# Patient Record
Sex: MALE | Race: WHITE | NOT HISPANIC OR LATINO | URBAN - METROPOLITAN AREA
[De-identification: names, ages, dates, MRNs, and addresses within clinical notes are randomized per-mention and may not be internally consistent; named-entity substitution may affect disease eponyms.]

---

## 2022-11-25 ENCOUNTER — OFFICE VISIT (OUTPATIENT)
Dept: FAMILY MEDICINE CLINIC | Facility: CLINIC | Age: 32
End: 2022-11-25

## 2022-11-25 VITALS
HEIGHT: 71 IN | DIASTOLIC BLOOD PRESSURE: 82 MMHG | HEART RATE: 78 BPM | OXYGEN SATURATION: 99 % | SYSTOLIC BLOOD PRESSURE: 132 MMHG | WEIGHT: 278 LBS | BODY MASS INDEX: 38.92 KG/M2

## 2022-11-25 DIAGNOSIS — G47.8 NON-RESTORATIVE SLEEP: ICD-10-CM

## 2022-11-25 DIAGNOSIS — H60.333 ACUTE SWIMMER'S EAR OF BOTH SIDES: ICD-10-CM

## 2022-11-25 DIAGNOSIS — G47.19 EXCESSIVE DAYTIME SLEEPINESS: ICD-10-CM

## 2022-11-25 DIAGNOSIS — H93.8X3 EAR SWELLING, BILATERAL: ICD-10-CM

## 2022-11-25 DIAGNOSIS — R63.5 WEIGHT GAIN: ICD-10-CM

## 2022-11-25 DIAGNOSIS — Z98.84 H/O LAPAROSCOPIC ADJUSTABLE GASTRIC BANDING: ICD-10-CM

## 2022-11-25 DIAGNOSIS — Z98.84 HISTORY OF BARIATRIC SURGERY: ICD-10-CM

## 2022-11-25 DIAGNOSIS — E66.09 CLASS 2 OBESITY DUE TO EXCESS CALORIES WITHOUT SERIOUS COMORBIDITY WITH BODY MASS INDEX (BMI) OF 38.0 TO 38.9 IN ADULT: ICD-10-CM

## 2022-11-25 DIAGNOSIS — Z13.1 SCREENING FOR DIABETES MELLITUS: ICD-10-CM

## 2022-11-25 DIAGNOSIS — Z00.00 ENCOUNTER FOR SCREENING AND PREVENTATIVE CARE: Primary | ICD-10-CM

## 2022-11-25 DIAGNOSIS — Z86.69 HISTORY OF OBSTRUCTIVE SLEEP APNEA: ICD-10-CM

## 2022-11-25 DIAGNOSIS — Z11.59 NEED FOR HEPATITIS C SCREENING TEST: ICD-10-CM

## 2022-11-25 DIAGNOSIS — Z13.0 SCREENING FOR DEFICIENCY ANEMIA: ICD-10-CM

## 2022-11-25 RX ORDER — NEOMYCIN SULFATE, POLYMYXIN B SULFATE AND HYDROCORTISONE 10; 3.5; 1 MG/ML; MG/ML; [USP'U]/ML
4 SUSPENSION/ DROPS AURICULAR (OTIC) 4 TIMES DAILY
Qty: 10 ML | Refills: 0 | Status: SHIPPED | OUTPATIENT
Start: 2022-11-25

## 2022-11-25 NOTE — PATIENT INSTRUCTIONS
Place referrals today for home sleep study and for the bariatric center to follow-up for the lap band  Please obtain the labs that I have ordered for you, no food or drink except for water for 8-12 hours before  This is very important for accurate numbers to review your cholesterol and for possible diabetes  If you require a Tdap, please come back to the office and schedule a nurse visit or when you return in 2 weeks to take a look at the ears

## 2022-11-25 NOTE — PROGRESS NOTES
New Patient Outpatient Note    HPI:     Burak Ramos, 28 y o  male  presents today as a new patient  Patient presents today to establish care  He is looking for a primary care provider locally so that he can follow-up for his several medical issues known  He has a history of bariatric surgery and placement of the lap band  This has not been adjusted in over for 5 years  He is looking to follow-up with bariatric surgery to help with weight loss as well  He also has a child coming in March of 2023  He is excited to welcome his child into the world and is interested in making sure that he is healthy  He does admit to having a history of sleep apnea in college, he did not wear his CPAP and therefore his sleep apnea has been untreated for several years  He is looking to follow-up due to excessive sleepiness during the day as well as non restorative sleep, gasping for breath/possible apneas at night  He understands that his weight does play a part  Family Hx  UTD in chart    Past Medical History:   Diagnosis Date   • Clotting disorder ([facility] [address] )         No past surgical history on file  Current Outpatient Medications:   •  neomycin-polymyxin-hydrocortisone (CORTISPORIN) 0 35%-10,000 units/mL-1% otic suspension, Administer 4 drops into both ears 4 (four) times a day, Disp: 10 mL, Rfl: 0     SOCIAL:   Rent/Own: Own  Currently living with: Wife  Stable food: Yes  Safe At Home: Yes  Hobbies: Hockey  Profession/ employment:  for [employer]   Restriction to medical procedures:  None    SEXUAL HISTORY:   Preference: Women  Sexually Active:  yes  Birth Control:   Actively pregnant    Psychological History  Psychiatric history: intermittent anxiety  History of inpatient: None  Current Therapy/ Provider: None  Current Medications: None    Substance History  Smoking: former, intermittent use currently  Alcohol Use: 1-2 drinks per week  Substance use: none      ROS:   Review of Systems Constitutional: Negative for chills, fever and unexpected weight change  HENT: Positive for postnasal drip and rhinorrhea  Negative for congestion, ear pain, sinus pressure, sinus pain and sore throat  Eyes: Negative for pain, discharge, itching and visual disturbance  Respiratory: Negative for cough, chest tightness and shortness of breath  Cardiovascular: Negative for chest pain and palpitations  Gastrointestinal: Negative for abdominal pain, blood in stool, constipation, diarrhea, nausea and vomiting  Genitourinary: Negative for dysuria and frequency  Skin: Negative for rash and wound  Neurological: Negative for dizziness, light-headedness and headaches  Psychiatric/Behavioral: Negative for self-injury and suicidal ideas  OBJECTIVE  Vitals:    11/25/22 0843   BP: 132/82   Pulse: 78   SpO2: 99%        Physical Exam  Constitutional:       General: He is not in acute distress  Appearance: Normal appearance  He is obese  He is not ill-appearing, toxic-appearing or diaphoretic  HENT:      Head: Normocephalic and atraumatic  Right Ear: Tympanic membrane and external ear normal  There is no impacted cerumen  Left Ear: Tympanic membrane and external ear normal  There is no impacted cerumen  Ears:      Comments: Bilateral ear swelling  Erythematous, nonpurulent, possible eczematous, increased protrusions in to the ear canal bilaterally  May be anatomical variant     Nose: Nose normal  No congestion or rhinorrhea  Mouth/Throat:      Mouth: Mucous membranes are moist       Pharynx: Oropharynx is clear  No oropharyngeal exudate or posterior oropharyngeal erythema  Eyes:      General:         Right eye: No discharge  Left eye: No discharge  Conjunctiva/sclera: Conjunctivae normal       Pupils: Pupils are equal, round, and reactive to light  Cardiovascular:      Rate and Rhythm: Normal rate and regular rhythm  Pulses: Normal pulses        Heart sounds: Normal heart sounds  No murmur heard  No friction rub  No gallop  Pulmonary:      Effort: Pulmonary effort is normal  No respiratory distress  Breath sounds: Normal breath sounds  No stridor  No wheezing, rhonchi or rales  Abdominal:      General: Bowel sounds are normal  There is no distension  Palpations: Abdomen is soft  Tenderness: There is no abdominal tenderness  Musculoskeletal:      Cervical back: Neck supple  No tenderness  Lymphadenopathy:      Cervical: No cervical adenopathy  Skin:     General: Skin is dry  Capillary Refill: Capillary refill takes less than 2 seconds  Neurological:      Mental Status: He is alert  Sensory: No sensory deficit ( light touch present in all 4 extremities)  Motor: No weakness (5/5 in all 4 extremities)  Deep Tendon Reflexes: Reflexes normal (2/4, intact, C5, C6, L4, S1)  ASSESSMENT AND PLAN   Tramaine was seen today for establish care  Diagnoses and all orders for this visit:    Encounter for screening and preventative care  Screening for deficiency anemia  Screening for diabetes mellitus  Weight gain  Class 2 obesity due to excess calories without serious comorbidity with body mass index (BMI) of 38 0 to 38 9 in adult  Need for hepatitis C screening test  Patient is looking to obtain baseline labs and testing to review for cholesterol, electrolytes, kidney/liver function, thyroid testing, and blood counts  He is interested in his cholesterol especially since he has been gaining weight  We will also obtain a TSH for further review of his thyroid function  -     Comprehensive metabolic panel; Future  -     CBC and differential; Future  -     Lipid panel; Future  -     TSH, 3rd generation with Free T4 reflex; Future    History of bariatric surgery  H/O laparoscopic adjustable gastric banding  Patient has history of bariatric surgery with Lap Band    The patient has not had follow up for some time and he still has the device in place  He is not benefiting from the device at this time for he continues to gain weight  -     Ambulatory Referral to Bariatric Surgery; Future    History of obstructive sleep apnea  Excessive daytime sleepiness  Non-restorative sleep  Previously diagnosed with sleep apnea and was on CPAP  The patient was non compliant with his CPAP so it was taken back and he has not been treating symptoms for over 5 years  Last use was in college  Will restart process with home sleep study and then move to titration if positive  -     Home Study; Future    Ear swelling, bilateral  Acute swimmer's ear of both sides  Patient has bilateral swelling or edema in ear canals  Will utilize the topical steroid in cortisporin to help with swelling, if not improved consider referral to ENT     -     neomycin-polymyxin-hydrocortisone (CORTISPORIN) 0 35%-10,000 units/mL-1% otic suspension; Administer 4 drops into both ears 4 (four) times a day        Layla Strauss DO  [facility]  11/25/2022 12:24 PM

## 2022-12-03 LAB — HCV AB SER-ACNC: NEGATIVE

## 2022-12-15 ENCOUNTER — TELEPHONE (OUTPATIENT)
Dept: FAMILY MEDICINE CLINIC | Facility: CLINIC | Age: 32
End: 2022-12-15

## 2022-12-15 NOTE — TELEPHONE ENCOUNTER
Called and left a voicemail to inform him I obtained labs    I will send mychart with this call to ensure patient receives information

## 2022-12-21 ENCOUNTER — RA CDI HCC (OUTPATIENT)
Dept: OTHER | Facility: HOSPITAL | Age: 32
End: 2022-12-21

## 2022-12-21 NOTE — PROGRESS NOTES
[facility]  coding opportunities       Chart reviewed, no opportunity found: CHART REVIEWED, NO OPPORTUNITY FOUND        Patients Insurance        Commercial Insurance: [address] [place] [address]

## 2022-12-28 ENCOUNTER — TELEPHONE (OUTPATIENT)
Dept: SLEEP CENTER | Facility: CLINIC | Age: 32
End: 2022-12-28

## 2022-12-29 ENCOUNTER — OFFICE VISIT (OUTPATIENT)
Dept: FAMILY MEDICINE CLINIC | Facility: CLINIC | Age: 32
End: 2022-12-29

## 2022-12-29 VITALS
HEART RATE: 71 BPM | WEIGHT: 271 LBS | DIASTOLIC BLOOD PRESSURE: 76 MMHG | HEIGHT: 71 IN | SYSTOLIC BLOOD PRESSURE: 110 MMHG | BODY MASS INDEX: 37.94 KG/M2 | OXYGEN SATURATION: 97 % | RESPIRATION RATE: 16 BRPM

## 2022-12-29 DIAGNOSIS — H93.8X3 EAR SWELLING, BILATERAL: ICD-10-CM

## 2022-12-29 DIAGNOSIS — H60.333 ACUTE SWIMMER'S EAR OF BOTH SIDES: ICD-10-CM

## 2022-12-29 DIAGNOSIS — E66.09 CLASS 2 OBESITY DUE TO EXCESS CALORIES WITHOUT SERIOUS COMORBIDITY WITH BODY MASS INDEX (BMI) OF 38.0 TO 38.9 IN ADULT: ICD-10-CM

## 2022-12-29 DIAGNOSIS — Z86.69 HISTORY OF OBSTRUCTIVE SLEEP APNEA: ICD-10-CM

## 2022-12-29 DIAGNOSIS — E78.1 HYPERTRIGLYCERIDEMIA: Primary | ICD-10-CM

## 2022-12-29 DIAGNOSIS — G47.8 NON-RESTORATIVE SLEEP: ICD-10-CM

## 2022-12-29 DIAGNOSIS — G47.19 EXCESSIVE DAYTIME SLEEPINESS: ICD-10-CM

## 2022-12-29 DIAGNOSIS — R74.8 LOW SERUM HDL: ICD-10-CM

## 2022-12-29 NOTE — PROGRESS NOTES
Outpatient Note- Follow up     HPI:     Karyn Morrell , 28 y o  male  presents today for referrals and labs  I personally reviewed the labs with the patient, overall the CMP, CBC, and TSH were within normal limits  His lipid panel had normal values for total cholesterol, LDL, and non-HDL  His triglycerides values were over double the recommended level at 330  His HDL was slightly decreased, under 30  He has been attempting to change his diet since we reviewed his labs over the phone  He has lost about 7 pounds and has started using more plant-based oils such as olive oil, avocado oil, and I Believe it is not better  He is also been limiting his carbs and higher caloric foods  Patient does admit to consuming large amounts of dairy especially since work has been stressful with deadlines and a completion of large project  He is looking to decrease his dairy consumption and improve exercise in the new year  The patient is looking to be healthier and has scheduled his home sleep study for February  He has not followed up with bariatric surgery, but plans to in the near year  He denies any fever, chills, nausea, vomiting, diarrhea, constipation, lightheadedness, dizziness, chest pain, shortness of breath  Past Medical History:   Diagnosis Date   • Clotting disorder ([facility] [address] )       ROS:   Review of Systems   See HPI    OBJECTIVE  Vitals:    12/29/22 1609   BP: 110/76   Pulse: 71   Resp: 16   SpO2: 97%        Physical Exam  Constitutional:       General: He is not in acute distress  Appearance: Normal appearance  He is obese  He is not ill-appearing, toxic-appearing or diaphoretic  HENT:      Head: Normocephalic and atraumatic  Right Ear: Tympanic membrane, ear canal and external ear normal  There is no impacted cerumen  Left Ear: Tympanic membrane, ear canal and external ear normal  There is no impacted cerumen  Nose: No congestion or rhinorrhea        Mouth/Throat:      Mouth: Mucous membranes are moist       Pharynx: No oropharyngeal exudate or posterior oropharyngeal erythema  Eyes:      General:         Right eye: No discharge  Left eye: No discharge  Pupils: Pupils are equal, round, and reactive to light  Cardiovascular:      Rate and Rhythm: Normal rate and regular rhythm  Pulses: Normal pulses  Heart sounds: Normal heart sounds  No murmur heard  No friction rub  No gallop  Pulmonary:      Effort: Pulmonary effort is normal  No respiratory distress  Breath sounds: Normal breath sounds  No stridor  No wheezing, rhonchi or rales  Abdominal:      General: Bowel sounds are normal  There is no distension  Palpations: Abdomen is soft  Tenderness: There is no abdominal tenderness  Musculoskeletal:      Cervical back: Neck supple  No tenderness  Lymphadenopathy:      Cervical: No cervical adenopathy  Skin:     General: Skin is warm  Capillary Refill: Capillary refill takes less than 2 seconds  Neurological:      Mental Status: He is alert  ASSESSMENT AND SHERRY   Tramaine was seen today for follow-up  Diagnoses and all orders for this visit:    Hypertriglyceridemia  Low serum HDL  Patient has evidence of low HDL and elevated triglycerides  I recommend that he  decrease higher cholesterol foods, increased plant-based oils, and monitor his caloric intake  He is already done a good job and reducing his weight by 7 pounds, this is primarily by eating healthier and decreasing carbohydrates  We will follow-up with the patient's values in 3 months  If this is still elevated I would recommend that the patient's start on Tricor to reduce the risk of long-term heart disease   -     Lipid panel; Future    Acute swimmer's ear of both sides  Ear swelling, bilateral  Patient still has inflammation of the ear canal bilaterally    He has not attempted the topical drops, and therefore recommend he pick them up and attempt to use them over the next 1 to 2 weeks  We will follow-up with his ears in about 3 months  Class 2 obesity due to excess calories without serious comorbidity with body mass index (BMI) of 38 0 to 38 9 in adult  History of obstructive sleep apnea  Excessive daytime sleepiness  Non-restorative sleep  Patient has been working on losing weight  He has also set up an appointment for his home sleep study in February  He is looking forward to following up and being more compliant on CPAP to improve his sleep  He continues to wake up frequently overnight especially with movement of his wife             Raquel Almodovar,   [facility]  12/29/2022 5:54 PM

## 2022-12-29 NOTE — PATIENT INSTRUCTIONS
Please obtain the lipid panel that I have ordered for you in about 3 months  This should be fasting, no food or drink except for water for 8 to 12 hours before  These make sure you are watching your diet and avoiding any foods that may be high and cholesterol  Try to stick more to plant-based oils such as olive oil, avocados, legumes, nuts  You can start using the drops if you like, there is still some irritation in bilateral sides  When you follow-up with me in about 3 months we will reevaluate this

## 2023-02-09 ENCOUNTER — HOSPITAL ENCOUNTER (OUTPATIENT)
Dept: SLEEP CENTER | Facility: CLINIC | Age: 33
Discharge: HOME | End: 2023-02-09

## 2023-02-09 DIAGNOSIS — G47.8 NON-RESTORATIVE SLEEP: ICD-10-CM

## 2023-02-09 DIAGNOSIS — Z86.69 HISTORY OF OBSTRUCTIVE SLEEP APNEA: ICD-10-CM

## 2023-02-09 DIAGNOSIS — G47.19 EXCESSIVE DAYTIME SLEEPINESS: ICD-10-CM

## 2023-02-10 NOTE — PROGRESS NOTES
Home Sleep Study Documentation    HOME STUDY DEVICE: Noxturnal no                                           Viviana G3 yes      Pre-Sleep Home Study:    Set-up and instructions performed by: Hannah    Technician performed demonstration for Patient: yes    Return demonstration performed by Patient: yes    Written instructions provided to Patient: yes    Patient signed consent form: yes        Post-Sleep Home Study:    Additional comments by Patient: None    Home Sleep Study Failed:no:    Failure reason: N/A    Reported or Detected: N/A    Scored by: Hannah

## 2023-02-15 ENCOUNTER — TELEPHONE (OUTPATIENT)
Dept: FAMILY MEDICINE CLINIC | Facility: CLINIC | Age: 33
End: 2023-02-15

## 2023-02-15 DIAGNOSIS — G47.8 NON-RESTORATIVE SLEEP: ICD-10-CM

## 2023-02-15 DIAGNOSIS — G47.33 MODERATE OBSTRUCTIVE SLEEP APNEA: Primary | ICD-10-CM

## 2023-02-15 DIAGNOSIS — G47.19 EXCESSIVE DAYTIME SLEEPINESS: ICD-10-CM

## 2023-02-15 DIAGNOSIS — Z86.69 HISTORY OF OBSTRUCTIVE SLEEP APNEA: ICD-10-CM

## 2023-02-15 NOTE — TELEPHONE ENCOUNTER
Called patient and informed him that his recent study at the sleep center requires further follow-up  The patient requires a titration  I will place orders and send a [employer] message      Isabel Parker DO  [facility]  2/15/2023 1:54 PM

## 2023-02-20 ENCOUNTER — TELEPHONE (OUTPATIENT)
Dept: SLEEP CENTER | Facility: CLINIC | Age: 33
End: 2023-02-20

## 2023-02-20 NOTE — TELEPHONE ENCOUNTER
----- Message from Jamal Collet, DO sent at 2/19/2023  9:50 PM EST -----  approved  ----- Message -----  From: Balwinder Gorman  Sent: 2/17/2023   8:00 AM EST  To: Sleep Medicine [facility] Provider    This Diagnostic sleep study needs approval      If approved please sign and return to clerical pool  If denied please include reasons why  Also provide alternative testing if warranted  Please sign and return to clerical pool

## 2023-02-20 NOTE — TELEPHONE ENCOUNTER
Home sleep study shows moderate JANET    Patient needs to be scheduled for a consult with Dr Marisol Weston     Left call back message

## 2023-03-14 NOTE — TELEPHONE ENCOUNTER
----- Message from Ashley Sheehan DO sent at 12/28/2022 12:18 PM EST -----  approved  ----- Message -----  From: Negro Braswell  Sent: 12/15/2022   7:29 AM EST  To: Sleep Medicine [facility] Provider    This home sleep study needs approval      If approved please sign and return to clerical pool  If denied please include reasons why  Also provide alternative testing if warranted  Please sign and return to clerical pool 
General Sunscreen Counseling: I recommended over-the-counter SPF 30 or higher sunscreen applied prior to going outdoors, even on cloudy days, and the use of broad-brimmed hats and sun-protective clothing. I advised that sunscreen should be reapplied every 2 hours, and immediately after swimming. I recommended the use of a daily facial moisturizer containing SPF 30 or higher sunscreen.
Products Recommended: Daily facial moisturizer - Cetaphil oil control moisturizer with SPF 30
Detail Level: Zone

## 2023-06-29 ENCOUNTER — HOSPITAL ENCOUNTER (OUTPATIENT)
Dept: SLEEP CENTER | Facility: CLINIC | Age: 33
Discharge: HOME | End: 2023-06-29
Payer: COMMERCIAL

## 2023-06-29 ENCOUNTER — TELEPHONE (OUTPATIENT)
Dept: SLEEP CENTER | Facility: CLINIC | Age: 33
End: 2023-06-29

## 2023-06-29 ENCOUNTER — TRANSCRIBE ORDERS (OUTPATIENT)
Dept: SLEEP CENTER | Facility: CLINIC | Age: 33
End: 2023-06-29

## 2023-06-29 DIAGNOSIS — G47.33 OSA (OBSTRUCTIVE SLEEP APNEA): ICD-10-CM

## 2023-06-29 DIAGNOSIS — G47.33 OSA (OBSTRUCTIVE SLEEP APNEA): Primary | ICD-10-CM

## 2023-06-29 PROCEDURE — 95811 POLYSOM 6/>YRS CPAP 4/> PARM: CPT

## 2023-06-29 NOTE — TELEPHONE ENCOUNTER
Wrong order was placed  Pt had a HST, needs a CPAP study  I transcribed for Dr Blake Baron to Ricardo  Emailed for auth to be updated

## 2023-06-30 NOTE — PROGRESS NOTES
Sleep Study Documentation    Pre-Sleep Study       Sleep testing procedure explained to patient:YES    Patient napped prior to study:NO    Caffeine:Dayshift worker after 12PM   Caffeine use:NO    Alcohol:Dayshift workers after 5PM: Alcohol use:NO    Typical day for patient:YES       Study Documentation    Sleep Study Indications: JANET    Sleep Study: Treatment   Optimal PAP pressure: 8  Leak:None  Snore:Eliminated  REM Obtained:yes  Supplemental O2: no    Minimum SaO2 88  Baseline SaO2 96  PAP mask tried (list all)N30, Gabino  PAP mask choice (final)Gabino  PAP mask type:nasal  PAP pressure at which snoring was eliminated 8  Minimum SaO2 at final PAP pressure 90  Mode of Therapy:CPAP  ETCO2:No  CPAP changed to BiPAP:No    Mode of Therapy:CPAP    EKG abnormalities: no     EEG abnormalities: no    Sleep Study Recorded < 2 hours: N/A    Sleep Study Recorded > 2 hours but incomplete study: N/A    Sleep Study Recorded 6 hours but no sleep obtained: NO    Patient classification: employed       Post-Sleep Study    Medication used at bedtime or during sleep study:NO    Patient reports time it took to fall asleep:less than 20 minutes    Patient reports waking up during study:3 or more times  Patient reports returning to sleep without difficulty  Patient reports sleeping 4 to 6 hours without dreaming  Patient reports sleep during study:better than usual    Patient rated sleepiness: Somewhat sleepy or tired    PAP treatment:yes: Post PAP treatment patient reports feeling better and  would wear PAP mask at home

## 2023-07-04 ENCOUNTER — TELEPHONE (OUTPATIENT)
Dept: FAMILY MEDICINE CLINIC | Facility: CLINIC | Age: 33
End: 2023-07-04

## 2023-07-12 ENCOUNTER — PATIENT MESSAGE (OUTPATIENT)
Dept: FAMILY MEDICINE CLINIC | Facility: CLINIC | Age: 33
End: 2023-07-12

## 2023-07-12 LAB
DME PARACHUTE DELIVERY DATE REQUESTED: NORMAL
DME PARACHUTE ITEM DESCRIPTION: NORMAL
DME PARACHUTE ORDER STATUS: NORMAL
DME PARACHUTE SUPPLIER NAME: NORMAL
DME PARACHUTE SUPPLIER PHONE: NORMAL

## 2023-07-12 NOTE — PROGRESS NOTES
Order placed after reviewing the patient's recent sleep study. He had sleep disorder insomnia and required a PAP of 8 to have improved sleep. According to the read he has sleep disorder breathing.       Elisabet Simon DO  [facility]  7/12/2023 2:26 PM

## 2023-07-12 NOTE — TELEPHONE ENCOUNTER
From: Jeffery Ochoa  To: Jillian Tee  Sent: 7/4/2023 10:52 AM EDT  Subject: Follow up sleep study    Dear . Bora Beverly,     Your CPAP study came back and recommended a specific pressure to help with your sleep. I just confirming you need a CPAP? I have written down in my notes you may have one already. I do not see an order for me. If you need a CPAP please let me know and I will place the order to start the process to get one. Thank you in advance for your time and response.      Sincerely,     Dr. Rebecca Simmons DO

## 2023-09-07 LAB

## 2023-09-16 LAB

## 2023-10-18 ENCOUNTER — PATIENT MESSAGE (OUTPATIENT)
Dept: FAMILY MEDICINE CLINIC | Facility: CLINIC | Age: 33
End: 2023-10-18

## 2023-10-20 LAB
DME PARACHUTE DELIVERY DATE ACTUAL: NORMAL
DME PARACHUTE DELIVERY DATE REQUESTED: NORMAL
DME PARACHUTE ITEM DESCRIPTION: NORMAL
DME PARACHUTE ORDER STATUS: NORMAL
DME PARACHUTE SUPPLIER NAME: NORMAL
DME PARACHUTE SUPPLIER PHONE: NORMAL

## 2024-02-26 LAB

## 2024-06-09 ENCOUNTER — APPOINTMENT (EMERGENCY)
Dept: RADIOLOGY | Facility: HOSPITAL | Age: 34
End: 2024-06-09
Payer: COMMERCIAL

## 2024-06-09 ENCOUNTER — APPOINTMENT (EMERGENCY)
Dept: CT IMAGING | Facility: HOSPITAL | Age: 34
End: 2024-06-09
Payer: COMMERCIAL

## 2024-06-09 ENCOUNTER — HOSPITAL ENCOUNTER (EMERGENCY)
Facility: HOSPITAL | Age: 34
Discharge: HOME | End: 2024-06-09
Attending: EMERGENCY MEDICINE
Payer: COMMERCIAL

## 2024-06-09 VITALS
RESPIRATION RATE: 18 BRPM | HEIGHT: 71 IN | WEIGHT: 250.66 LBS | OXYGEN SATURATION: 96 % | DIASTOLIC BLOOD PRESSURE: 79 MMHG | HEART RATE: 65 BPM | TEMPERATURE: 98.2 F | SYSTOLIC BLOOD PRESSURE: 169 MMHG | BODY MASS INDEX: 35.09 KG/M2

## 2024-06-09 DIAGNOSIS — K95.09 GASTRIC BAND MALFUNCTION: Primary | ICD-10-CM

## 2024-06-09 DIAGNOSIS — R10.9 ABDOMINAL PAIN: ICD-10-CM

## 2024-06-09 DIAGNOSIS — R11.10 VOMITING: ICD-10-CM

## 2024-06-09 LAB
ALBUMIN SERPL BCP-MCNC: 4.2 G/DL (ref 3.5–5)
ALP SERPL-CCNC: 60 U/L (ref 34–104)
ALT SERPL W P-5'-P-CCNC: 30 U/L (ref 7–52)
ANION GAP SERPL CALCULATED.3IONS-SCNC: 6 MMOL/L (ref 4–13)
AST SERPL W P-5'-P-CCNC: 24 U/L (ref 13–39)
BASOPHILS # BLD AUTO: 0.03 THOUSANDS/ÂΜL (ref 0–0.1)
BASOPHILS NFR BLD AUTO: 1 % (ref 0–1)
BILIRUB SERPL-MCNC: 0.79 MG/DL (ref 0.2–1)
BUN SERPL-MCNC: 15 MG/DL (ref 5–25)
CALCIUM SERPL-MCNC: 9.3 MG/DL (ref 8.4–10.2)
CHLORIDE SERPL-SCNC: 104 MMOL/L (ref 96–108)
CO2 SERPL-SCNC: 27 MMOL/L (ref 21–32)
CREAT SERPL-MCNC: 1.02 MG/DL (ref 0.6–1.3)
EOSINOPHIL # BLD AUTO: 0.06 THOUSAND/ÂΜL (ref 0–0.61)
EOSINOPHIL NFR BLD AUTO: 1 % (ref 0–6)
ERYTHROCYTE [DISTWIDTH] IN BLOOD BY AUTOMATED COUNT: 13.3 % (ref 11.6–15.1)
GFR SERPL CREATININE-BSD FRML MDRD: 95 ML/MIN/1.73SQ M
GLUCOSE SERPL-MCNC: 88 MG/DL (ref 65–140)
HCT VFR BLD AUTO: 46.7 % (ref 36.5–49.3)
HGB BLD-MCNC: 15.5 G/DL (ref 12–17)
IMM GRANULOCYTES # BLD AUTO: 0.01 THOUSAND/UL (ref 0–0.2)
IMM GRANULOCYTES NFR BLD AUTO: 0 % (ref 0–2)
LACTATE SERPL-SCNC: 0.6 MMOL/L (ref 0.5–2)
LIPASE SERPL-CCNC: 19 U/L (ref 11–82)
LYMPHOCYTES # BLD AUTO: 1.36 THOUSANDS/ÂΜL (ref 0.6–4.47)
LYMPHOCYTES NFR BLD AUTO: 22 % (ref 14–44)
MCH RBC QN AUTO: 29.6 PG (ref 26.8–34.3)
MCHC RBC AUTO-ENTMCNC: 33.2 G/DL (ref 31.4–37.4)
MCV RBC AUTO: 89 FL (ref 82–98)
MONOCYTES # BLD AUTO: 0.84 THOUSAND/ÂΜL (ref 0.17–1.22)
MONOCYTES NFR BLD AUTO: 14 % (ref 4–12)
NEUTROPHILS # BLD AUTO: 3.82 THOUSANDS/ÂΜL (ref 1.85–7.62)
NEUTS SEG NFR BLD AUTO: 62 % (ref 43–75)
NRBC BLD AUTO-RTO: 0 /100 WBCS
PLATELET # BLD AUTO: 263 THOUSANDS/UL (ref 149–390)
PMV BLD AUTO: 9.1 FL (ref 8.9–12.7)
POTASSIUM SERPL-SCNC: 4.1 MMOL/L (ref 3.5–5.3)
PROT SERPL-MCNC: 7 G/DL (ref 6.4–8.4)
RBC # BLD AUTO: 5.24 MILLION/UL (ref 3.88–5.62)
SODIUM SERPL-SCNC: 137 MMOL/L (ref 135–147)
WBC # BLD AUTO: 6.12 THOUSAND/UL (ref 4.31–10.16)

## 2024-06-09 PROCEDURE — 74177 CT ABD & PELVIS W/CONTRAST: CPT

## 2024-06-09 PROCEDURE — 80053 COMPREHEN METABOLIC PANEL: CPT

## 2024-06-09 PROCEDURE — 96365 THER/PROPH/DIAG IV INF INIT: CPT

## 2024-06-09 PROCEDURE — 96366 THER/PROPH/DIAG IV INF ADDON: CPT

## 2024-06-09 PROCEDURE — 85025 COMPLETE CBC W/AUTO DIFF WBC: CPT

## 2024-06-09 PROCEDURE — 36415 COLL VENOUS BLD VENIPUNCTURE: CPT

## 2024-06-09 PROCEDURE — 83605 ASSAY OF LACTIC ACID: CPT

## 2024-06-09 PROCEDURE — 74018 RADEX ABDOMEN 1 VIEW: CPT

## 2024-06-09 PROCEDURE — 99285 EMERGENCY DEPT VISIT HI MDM: CPT | Performed by: EMERGENCY MEDICINE

## 2024-06-09 PROCEDURE — 99284 EMERGENCY DEPT VISIT MOD MDM: CPT

## 2024-06-09 PROCEDURE — 83690 ASSAY OF LIPASE: CPT

## 2024-06-09 RX ORDER — SODIUM CHLORIDE, SODIUM GLUCONATE, SODIUM ACETATE, POTASSIUM CHLORIDE, MAGNESIUM CHLORIDE, SODIUM PHOSPHATE, DIBASIC, AND POTASSIUM PHOSPHATE .53; .5; .37; .037; .03; .012; .00082 G/100ML; G/100ML; G/100ML; G/100ML; G/100ML; G/100ML; G/100ML
1000 INJECTION, SOLUTION INTRAVENOUS ONCE
Status: COMPLETED | OUTPATIENT
Start: 2024-06-09 | End: 2024-06-09

## 2024-06-09 RX ADMIN — IOHEXOL 50 ML: 240 INJECTION, SOLUTION INTRATHECAL; INTRAVASCULAR; INTRAVENOUS; ORAL at 14:02

## 2024-06-09 RX ADMIN — IOHEXOL 50 ML: 300 INJECTION, SOLUTION INTRAVENOUS at 17:38

## 2024-06-09 RX ADMIN — SODIUM CHLORIDE, SODIUM GLUCONATE, SODIUM ACETATE, POTASSIUM CHLORIDE, MAGNESIUM CHLORIDE, SODIUM PHOSPHATE, DIBASIC, AND POTASSIUM PHOSPHATE 1000 ML: .53; .5; .37; .037; .03; .012; .00082 INJECTION, SOLUTION INTRAVENOUS at 13:05

## 2024-06-09 RX ADMIN — IOHEXOL 85 ML: 350 INJECTION, SOLUTION INTRAVENOUS at 14:02

## 2024-06-09 NOTE — CONSULTS
Consultation - Bariatric Surgery   Tramaine Mijares 34 y.o. male MRN: [id number]  Unit/Bed#: ED-19 Encounter: [id number]    Assessment & Plan     Assessment:  35 y/o M with a history of gastric band placement in 2010 who presents to the ED for 24-48h of inability to keep liquids down. CT scan shows evidence of contrast stasis at the level of the gastric band suggestive of band obstruction.    Plan:  -Aspirated 10cc of fluid out of gastric band emptying it completely  -Will plan to given patient oral contrast and get KUB to evaluate flow of contrast  -If continues to be unable to tolerate PO, may require removal of gastric band    History of Present Illness     HPI:  Tramaine Mijares is a 34 y.o. male Body mass index is 34.96 kg/m². with history of gastric band placement in 2010 who presnts to the ED with inability to tolerate PO intake for the past 24-48h. He has noticed that this started since his son was jumping up on his abdomen on Friday which he suspects may have dislodged the band. Since that event he has been unable to keep any food or liquid down.  This AM he presented to the ED and CT showed evidence of contrast stasis at the level of the gastric band. Patient otherwise stable and labs within normal limits.      Review of Systems   All other systems reviewed and are negative.      Historical Information   Past Medical History:   Diagnosis Date    Clotting disorder (HCC)      Past Surgical History:   Procedure Laterality Date    HERNIA REPAIR      LAPAROSCOPIC GASTRIC BANDING      approx 09-10    WRIST SURGERY       Social History   Social History     Substance and Sexual Activity   Alcohol Use Yes    Alcohol/week: 1.0 - 2.0 standard drink of alcohol    Types: 1 - 2 Standard drinks or equivalent per week     Social History     Substance and Sexual Activity   Drug Use Never     Social History     Tobacco Use   Smoking Status Former    Current packs/day: 0.25    Average packs/day: 0.3 packs/day for 3.0  years (0.8 ttl pk-yrs)    Types: Cigarettes   Smokeless Tobacco Never     Family History: non-contributory    Meds/Allergies   all current active meds have been reviewed  No Known Allergies    Objective   First Vitals:   Blood Pressure: 169/79 (06/09/24 1132)  Pulse: 65 (06/09/24 1132)  Temperature: 98.2 °F (36.8 °C) (06/09/24 1132)  Temp Source: Oral (06/09/24 1132)  Respirations: 18 (06/09/24 1132)  Weight - Scale: 114 kg (250 lb 10.6 oz) (06/09/24 1132)  SpO2: 96 % (06/09/24 1132)    Current Vitals:   Blood Pressure: 169/79 (06/09/24 1132)  Pulse: 65 (06/09/24 1132)  Temperature: 98.2 °F (36.8 °C) (06/09/24 1132)  Temp Source: Oral (06/09/24 1132)  Respirations: 18 (06/09/24 1132)  Weight - Scale: 114 kg (250 lb 10.6 oz) (06/09/24 1132)  SpO2: 96 % (06/09/24 1132)    No intake or output data in the 24 hours ending 06/09/24 1638    Invasive Devices       Peripheral Intravenous Line  Duration             Peripheral IV 06/09/24 Right Antecubital <1 day                    Physical Exam  Constitutional:       Appearance: He is well-developed.   HENT:      Head: Normocephalic and atraumatic.   Cardiovascular:      Rate and Rhythm: Normal rate.      Heart sounds: Normal heart sounds.   Pulmonary:      Effort: Pulmonary effort is normal.   Abdominal:      Palpations: Abdomen is soft.      Comments: Palpable gastric band port in RUQ. Removed 10cc of fluid from port (now emptied completely)   Neurological:      General: No focal deficit present.      Mental Status: He is alert.         Lab Results: CBC:   Lab Results   Component Value Date    WBC 6.12 06/09/2024    HGB 15.5 06/09/2024    HCT 46.7 06/09/2024    MCV 89 06/09/2024     06/09/2024    RBC 5.24 06/09/2024    MCH 29.6 06/09/2024    MCHC 33.2 06/09/2024    RDW 13.3 06/09/2024    MPV 9.1 06/09/2024    NRBC 0 06/09/2024     Imaging: I have personally reviewed pertinent reports.    EKG, Pathology, and Other Studies: I have personally reviewed pertinent reports.

## 2024-06-09 NOTE — ED ATTENDING ATTESTATION
6/9/2024  I, Shad Eubanks MD, saw and evaluated the patient. I have discussed the patient with the resident/non-physician practitioner and agree with the resident's/non-physician practitioner's findings, Plan of Care, and MDM as documented in the resident's/non-physician practitioner's note, except where noted. All available labs and Radiology studies were reviewed.  I was present for key portions of any procedure(s) performed by the resident/non-physician practitioner and I was immediately available to provide assistance.    At this point I agree with the current assessment done in the Emergency Department. I have conducted an independent evaluation of this patient a history and physical is as follows:    This is a 34 y.o. old male who presents to the ED for evaluation of abdominal pain.  History of lap band in 2010, for the last few days states when he eats or drinks he feels like there is an obstruction that he cannot get food passed.  He has pressure in his chest when he attempts to ingest anything no abdominal swelling.  No changes in bowel habits.  Surgery was at [facility] in 2010 however he has been lost to follow-up there.  He is open to transfer care here to [facility] and is requested a referral from his family doctor which is currently pending.    VS and nursing notes reviewed  General: Appears in NAD, awake, alert, speaking normally in full sentences.   Well-nourished, well-developed. Appears stated age.   Head: Normocephalic, atraumatic.  Eyes: EOMI. Vision grossly normal. No subconjunctival hemorrhages or occular discharge noted. Symmetrical lids.   ENT: Atraumatic external nose and ears. No stridor. Normal phonation. No drooling. Normal swallowing.   Neck: No JVD. FROM. No goiter  CV: No pallor. Normal rate.  Lungs: No tachypnea. No respiratory distress.  ABD: No epigastric tenderness.  MSK: Moving all extremities equally, no peripheral edema  Skin: Dry, intact. No  cyanosis  Neuro: Awake, alert, GCS15. CN II-XII grossly intact. Grossly normal gait.  Psychiatric/Behavioral: Appropriate mood and affect.     A/P: This is a 34 y.o. male who presents to the ED for evaluation of difficulty swallowing.  Exam is reassuring.  At this time, concern for Lap-Band complication.  CAT scan p.o. IV contrast.  Reevaluate disposition accordingly.      ED Course         Critical Care Time  Procedures

## 2024-06-09 NOTE — DISCHARGE INSTRUCTIONS
Please follow-up with bariatric surgeon, Dr. Glass, number listed below/on next page.    Please return the emergency department if you have recurrence of the symptoms.  Vomiting blood, worsening pain, or any new or worsening symptoms.

## 2024-06-09 NOTE — ED PROVIDER NOTES
History  Chief Complaint   Patient presents with    Abdominal Pain     Patient had gastric surgery in 2010 and had a lap band placed. Patient is the last 3 days hasn't been able to keep anything food/water down, patient is worried that the band might have moved on him   Vomiting with eating only, patient is passing gas and having BMs     HPI  (Tramaine Mijares) Tramaine Mijares is a 34 y.o. male     They presented to the emergency department on June 12, 2024.   Chief Complaint   Patient presents with    Abdominal Pain     Patient had gastric surgery in 2010 and had a lap band placed. Patient is the last 3 days hasn't been able to keep anything food/water down, patient is worried that the band might have moved on him   Vomiting with eating only, patient is passing gas and having BMs   .    The patient states that he does not feel nauseous at all but every time he takes anything by mouth whether it be liquid or solid he vomits it back up.  Patient states he feels a getting stuck at the lower chest/upper abdomen region.  Patient states that his son who is approximately 6 months of age was jumping on his stomach 3 nights ago denies any other injuries. Patient denies fever, chills, nausea, diarrhea, constipation, chest pain, shortness of breath, hematic emesis, or any other complaint at this time.  Patient is able to tolerate his secretions    Prior to Admission Medications   Prescriptions Last Dose Informant Patient Reported? Taking?   neomycin-polymyxin-hydrocortisone (CORTISPORIN) 0.35%-10,000 units/mL-1% otic suspension   No No   Sig: Administer 4 drops into both ears 4 (four) times a day   Patient not taking: Reported on 12/29/2022      Facility-Administered Medications: None       Past Medical History:   Diagnosis Date    Clotting disorder (HCC)        Past Surgical History:   Procedure Laterality Date    HERNIA REPAIR      LAPAROSCOPIC GASTRIC BANDING      approx 09-10    WRIST SURGERY         Family History    Problem Relation Age of Onset    TAYLER disease Mother     Hypertension Father     Hyperlipidemia Father     Lung cancer Maternal Uncle     Hepatitis Maternal Uncle     Colon cancer Neg Hx     Prostate cancer Neg Hx     Testicular cancer Neg Hx      I have reviewed and agree with the history as documented.    E-Cigarette/Vaping    E-Cigarette Use Former User      E-Cigarette/Vaping Substances     Social History     Tobacco Use    Smoking status: Former     Current packs/day: 0.25     Average packs/day: 0.3 packs/day for 3.0 years (0.8 ttl pk-yrs)     Types: Cigarettes    Smokeless tobacco: Never   Vaping Use    Vaping status: Former   Substance Use Topics    Alcohol use: Yes     Alcohol/week: 1.0 - 2.0 standard drink of alcohol     Types: 1 - 2 Standard drinks or equivalent per week    Drug use: Never        Review of Systems   Constitutional:  Negative for chills and fever.   HENT:  Negative for ear pain and sore throat.    Eyes:  Negative for pain and visual disturbance.   Respiratory:  Negative for cough and shortness of breath.    Cardiovascular:  Negative for chest pain and palpitations.   Gastrointestinal:  Positive for abdominal pain and vomiting. Negative for blood in stool, constipation, diarrhea and nausea.        Denies hematemesis   Genitourinary:  Negative for dysuria and hematuria.   Musculoskeletal:  Negative for arthralgias and back pain.   Skin:  Negative for color change and rash.   Neurological:  Negative for seizures and syncope.   All other systems reviewed and are negative.      Physical Exam  ED Triage Vitals [06/09/24 1132]   Temperature Pulse Respirations Blood Pressure SpO2   98.2 °F (36.8 °C) 65 18 169/79 96 %      Temp Source Heart Rate Source Patient Position - Orthostatic VS BP Location FiO2 (%)   Oral Monitor -- Right arm --      Pain Score       --             Orthostatic Vital Signs  Vitals:    06/09/24 1132   BP: 169/79   Pulse: 65       Physical Exam  Vitals and nursing note  reviewed.   Constitutional:       General: He is not in acute distress.     Appearance: He is well-developed.   HENT:      Head: Normocephalic and atraumatic.   Eyes:      Conjunctiva/sclera: Conjunctivae normal.   Cardiovascular:      Rate and Rhythm: Normal rate and regular rhythm.      Heart sounds: No murmur heard.  Pulmonary:      Effort: Pulmonary effort is normal. No respiratory distress.      Breath sounds: Normal breath sounds.   Abdominal:      Palpations: Abdomen is soft.      Tenderness: There is no abdominal tenderness. There is no right CVA tenderness, left CVA tenderness, guarding or rebound.   Musculoskeletal:         General: No swelling.      Cervical back: Neck supple.   Skin:     General: Skin is warm and dry.      Capillary Refill: Capillary refill takes less than 2 seconds.   Neurological:      Mental Status: He is alert.   Psychiatric:         Mood and Affect: Mood normal.         ED Medications  Medications   multi-electrolyte (PLASMALYTE-A/ISOLYTE-S PH 7.4) IV solution 1,000 mL (0 mL Intravenous Stopped 6/9/24 1500)   iohexol (OMNIPAQUE) 240 MG/ML solution 50 mL (50 mL Oral Given 6/9/24 1402)   iohexol (OMNIPAQUE) 350 MG/ML injection (MULTI-DOSE) 85 mL (85 mL Intravenous Given 6/9/24 1402)   iohexol (OMNIPAQUE) 300 mg/mL injection 50 mL (50 mL Oral Given 6/9/24 1738)       Diagnostic Studies  Results Reviewed       Procedure Component Value Units Date/Time    Lipase [[id number]]  (Normal) Collected: 06/09/24 1305    Lab Status: Final result Specimen: Blood from Arm, Right Updated: 06/09/24 1427     Lipase 19 u/L     Lactic acid, plasma (w/reflex if result > 2.0) [[id number]]  (Normal) Collected: 06/09/24 1305    Lab Status: Final result Specimen: Blood from Arm, Right Updated: 06/09/24 1340     LACTIC ACID 0.6 mmol/L     Narrative:      Result may be elevated if tourniquet was used during collection.    Comprehensive metabolic panel [[id number]] Collected: 06/09/24 1305    Lab Status: Final  result Specimen: Blood from Arm, Right Updated: 06/09/24 1339     Sodium 137 mmol/L      Potassium 4.1 mmol/L      Chloride 104 mmol/L      CO2 27 mmol/L      ANION GAP 6 mmol/L      BUN 15 mg/dL      Creatinine 1.02 mg/dL      Glucose 88 mg/dL      Calcium 9.3 mg/dL      AST 24 U/L      ALT 30 U/L      Alkaline Phosphatase 60 U/L      Total Protein 7.0 g/dL      Albumin 4.2 g/dL      Total Bilirubin 0.79 mg/dL      eGFR 95 ml/min/1.73sq m     Narrative:      National Kidney Disease Foundation guidelines for Chronic Kidney Disease (CKD):     Stage 1 with normal or high GFR (GFR > 90 mL/min/1.73 square meters)    Stage 2 Mild CKD (GFR = 60-89 mL/min/1.73 square meters)    Stage 3A Moderate CKD (GFR = 45-59 mL/min/1.73 square meters)    Stage 3B Moderate CKD (GFR = 30-44 mL/min/1.73 square meters)    Stage 4 Severe CKD (GFR = 15-29 mL/min/1.73 square meters)    Stage 5 End Stage CKD (GFR <15 mL/min/1.73 square meters)  Note: GFR calculation is accurate only with a steady state creatinine    CBC and differential [[id number]]  (Abnormal) Collected: 06/09/24 1305    Lab Status: Final result Specimen: Blood from Arm, Right Updated: 06/09/24 1317     WBC 6.12 Thousand/uL      RBC 5.24 Million/uL      Hemoglobin 15.5 g/dL      Hematocrit 46.7 %      MCV 89 fL      MCH 29.6 pg      MCHC 33.2 g/dL      RDW 13.3 %      MPV 9.1 fL      Platelets 263 Thousands/uL      nRBC 0 /100 WBCs      Segmented % 62 %      Immature Grans % 0 %      Lymphocytes % 22 %      Monocytes % 14 %      Eosinophils Relative 1 %      Basophils Relative 1 %      Absolute Neutrophils 3.82 Thousands/µL      Absolute Immature Grans 0.01 Thousand/uL      Absolute Lymphocytes 1.36 Thousands/µL      Absolute Monocytes 0.84 Thousand/µL      Eosinophils Absolute 0.06 Thousand/µL      Basophils Absolute 0.03 Thousands/µL                    XR abdomen 1 view portable   ED Interpretation by Sarika Molina DO (06/09 7234)   Contrast appears to be passing  through the upper stomach and into small intestine.  Improved from CT scan.      Final Result by Armand Rivera MD (06/10 0945)      Gastric lap band is present.      Oral contrast has progressed into the stomach and proximal duodenum.                  Workstation performed: [id number]         CT abdomen pelvis with contrast   Final Result by Henry Vogel MD (06/09 1442)      Slightly abnormal positioning of a gastric band with significant stomal stenosis. The esophagus is dilated and there is no passage of oral contrast into the distal stomach.      The study was marked in EPIC for immediate notification.         Workstation performed: [id number]               Procedures  Procedures      ED Course  ED Course as of 06/12/24 0503   Sun Jun 09, 2024   1319 CBC and differential(!)  Leukocytosis leukopenia.  Hemoglobin hematocrit within normal limits.  Platelets within normal limits.  Reassuring differential.   1359 Comprehensive metabolic panel  Within normal limits.   1359 LACTIC ACID: 0.6   1428 LIPASE: 19  Unlikely pancreatitis.   1445 CT abdomen pelvis with contrast  IMPRESSION:     Slightly abnormal positioning of a gastric band with significant stomal stenosis. The esophagus is dilated and there is no passage of oral contrast into the distal stomach.     The study was marked in EPIC for immediate notification.     1632 Bariatrics to see in ED   1706 Bariatric fellow evaluated patient in the emergency department, removed saline from gastric band.  Recommended abdominal x-ray with contrast to see if contrast will pass into the stomach.   1734 XR abdomen 1 view portable  Contrast appears to be passing through the upper stomach and into small intestine.  Improved from CT scan.   1743 Spoke with bariatric surgery, recommended follow-up in outpatient office with Dr. Glass                                       Medical Decision Making  35 yo M presented to ED for vomiting. Associated symptoms: upper abd  discomfort. Exam findings: Benign exam.  Differentials diagnoses considered: Gastric sleeve malposition versus food bolus versus gastric sleeve causing obstruction to his pancreatitis versus gastritis.  See ED course for more details on lab and imaging interpretation as well as discussion with consults.  Based on these results and H&P, gastric sleeve malposition as well as causing obstruction. Results and clinical impressions were discussed with patient and family. They expressed understanding. Plan: Discharged home with instruction to follow-up with primary care doctor, instruction to follow-up with bariatric surgery instructed to return emerged part for any new or worsening symptoms. This plan was also discussed with patient, who was agreeable with this plan. Patient was given the opportunity to ask questions in ED. All questions and concerns were addressed in ED.     Amount and/or Complexity of Data Reviewed  Labs: ordered. Decision-making details documented in ED Course.  Radiology: ordered and independent interpretation performed. Decision-making details documented in ED Course.    Risk  Prescription drug management.          Disposition  Final diagnoses:   Gastric band malfunction   Abdominal pain   Vomiting     Time reflects when diagnosis was documented in both MDM as applicable and the Disposition within this note       Time User Action Codes Description Comment    6/9/2024  5:52 PM Sarika Molina Add [K95.09] Gastric band malfunction     6/9/2024  5:53 PM Sarika Molina Add [R10.9] Abdominal pain     6/9/2024  5:53 PM Sarika Molina Add [R11.10] Vomiting           ED Disposition       ED Disposition   Discharge    Condition   Stable    Date/Time   Sun Jun 9, 2024  5:53 PM    Comment   Tramaine Mijares discharge to home/self care.                   Follow-up Information       Follow up With Specialties Details Why Contact Info Additional Information    Enrico Schmitt, DO Family Medicine Call in  1 day As needed [address] [facility]  [address]  [facility] [address]  [phone]       [facility] Emergency Department Emergency Medicine Go to  As needed, If symptoms worsen [address] [facility] [address]  [phone] [facility], [address] [place], [place], [address]    Calvin Glass MD Bariatrics, General Surgery Call in 1 day  [address] [address]  [phone]       Calvin Glass MD Bariatrics, General Surgery Call in 1 day  [address] [address]  [phone]               Discharge Medication List as of 6/9/2024  5:53 PM        CONTINUE these medications which have NOT CHANGED    Details   neomycin-polymyxin-hydrocortisone (CORTISPORIN) 0.35%-10,000 units/mL-1% otic suspension Administer 4 drops into both ears 4 (four) times a day, Starting Fri 11/25/2022, Normal           No discharge procedures on file.    PDMP Review       None             ED Provider  Attending physically available and evaluated Tramaine Mijares. I managed the patient along with the ED Attending.    Electronically Signed by           Sarika Molina DO  06/12/24 3684

## 2024-06-12 ENCOUNTER — OFFICE VISIT (OUTPATIENT)
Dept: FAMILY MEDICINE CLINIC | Facility: CLINIC | Age: 34
End: 2024-06-12
Payer: COMMERCIAL

## 2024-06-12 VITALS
OXYGEN SATURATION: 98 % | HEART RATE: 78 BPM | SYSTOLIC BLOOD PRESSURE: 122 MMHG | WEIGHT: 265 LBS | BODY MASS INDEX: 37.1 KG/M2 | DIASTOLIC BLOOD PRESSURE: 80 MMHG | HEIGHT: 71 IN

## 2024-06-12 DIAGNOSIS — Z98.84 H/O LAPAROSCOPIC ADJUSTABLE GASTRIC BANDING: ICD-10-CM

## 2024-06-12 DIAGNOSIS — G47.33 MODERATE OBSTRUCTIVE SLEEP APNEA: ICD-10-CM

## 2024-06-12 DIAGNOSIS — Z82.49 FAMILY HISTORY OF CARDIAC ARREST: Primary | ICD-10-CM

## 2024-06-12 DIAGNOSIS — G47.33 OSA (OBSTRUCTIVE SLEEP APNEA): ICD-10-CM

## 2024-06-12 DIAGNOSIS — Z82.49 FAMILY HISTORY OF CHF (CONGESTIVE HEART FAILURE): ICD-10-CM

## 2024-06-12 DIAGNOSIS — Z82.49 FAMILY HISTORY OF HEART DISEASE: ICD-10-CM

## 2024-06-12 DIAGNOSIS — Z98.84 HISTORY OF BARIATRIC SURGERY: ICD-10-CM

## 2024-06-12 DIAGNOSIS — G47.8 NON-RESTORATIVE SLEEP: ICD-10-CM

## 2024-06-12 PROCEDURE — 99213 OFFICE O/P EST LOW 20 MIN: CPT | Performed by: FAMILY MEDICINE

## 2024-06-12 NOTE — PROGRESS NOTES
Outpatient Note- Follow up     HPI:     Tramaine Mijares , 34 y.o. male  presents today for for multiple concerns.  Over the last several months the patient has been having difficulty with his CPAP and unable to tolerate the multiple masks secondary to the increased dryness in the mouth.  He woke up more times with dry mouth and his tongue sticking to the side or roof of his mouth limiting his sleep than the actual sleep apnea.  Due to his failure of the CPAP, he has been looking into inspire.  He did his research online with the specialty physicians that do this, he was interested in following up with Nico Melissa MD.  He has been having difficulty with obtaining a office visit just due to scheduling restraints.    Additionally the patient brings to my attention he was in the emergency room within the past week.  It seems that the gastric band that he had slipped up and required further intervention.  They removed the saline from the band and he will need to follow-up with the bariatric surgeons for next steps.    Lastly unfortunately his father  suddenly while loading a truck full of lumber.  He went into likely cardiac arrest and unfortunately he was unable to be revived.  The patient also has a strong family history in his paternal grandfather who  suddenly from cardiac related issues.  He is interested in following up with a cardiac physician locally within [facility].    Past Medical History:   Diagnosis Date    Clotting disorder (HCC)       ROS:   Review of Systems   Denies any fever, chills, nausea, vomiting, lightheadedness, dizziness, chest pain, shortness of breath.    OBJECTIVE  Vitals:    24 1642   BP: 122/80   Pulse: 78   SpO2: 98%        Physical Exam  Constitutional:       General: He is not in acute distress.     Appearance: Normal appearance. He is obese. He is not ill-appearing, toxic-appearing or diaphoretic.   HENT:      Head: Normocephalic and atraumatic.      Right Ear:  Tympanic membrane, ear canal and external ear normal. There is no impacted cerumen.      Left Ear: Tympanic membrane, ear canal and external ear normal. There is no impacted cerumen.      Nose: Nose normal. No congestion or rhinorrhea.      Mouth/Throat:      Mouth: Mucous membranes are moist.      Pharynx: Oropharynx is clear. No oropharyngeal exudate or posterior oropharyngeal erythema.   Eyes:      General:         Right eye: No discharge.         Left eye: No discharge.      Pupils: Pupils are equal, round, and reactive to light.   Cardiovascular:      Rate and Rhythm: Normal rate and regular rhythm.      Heart sounds: Normal heart sounds. No murmur heard.     No friction rub. No gallop.   Pulmonary:      Effort: Pulmonary effort is normal. No respiratory distress.      Breath sounds: Normal breath sounds. No stridor. No wheezing, rhonchi or rales.   Abdominal:      General: Bowel sounds are normal. There is no distension.      Palpations: Abdomen is soft.      Tenderness: There is no abdominal tenderness.   Musculoskeletal:      Cervical back: Neck supple. No tenderness.   Lymphadenopathy:      Cervical: No cervical adenopathy.   Skin:     General: Skin is warm.   Neurological:      Mental Status: He is alert.        ASSESSMENT AND PLAN   Tramaine was seen today for follow-up.    Diagnoses and all orders for this visit:    Family history of cardiac arrest  Family history of heart disease  Family history of CHF (congestive heart failure)  Patient presents today with concern about family history of heart disease.  He is interested in following up with cardiology to ensure that he continues to remain as healthy as he can while attempting to lose weight and improve his diet.  He is to call back with any issues with scheduling.  Referral placed.    Moderate obstructive sleep apnea  Non-restorative sleep  JANET (obstructive sleep apnea)  Patient has a history of moderate obstructive sleep apnea.  He was unable to  tolerate the CPAP, therefore is looking into inspire.  We reviewed the qualifications for [facility] and it includes the patient greater than 22 years of age, less than or equal to a BMI of 32, an AHI of greater than or equal to 20, a sleep study within the last 2 years and CPAP intolerance which the patient has.  He has a higher BMI than 32, but will be attempting to lose weight to help with heart and overall health    History of bariatric surgery  H/O laparoscopic adjustable gastric banding  Recently had a slippage of his adjustable gastric banding.  They release the pressure in the banding apparatus, and he will need to follow-up with bariatric surgery.  He has an appointment coming up in July.  He is to call with any issues with follow-up.          Enrico Schmitt DO  [facility]  6/12/2024 5:06 PM

## 2024-06-17 PROBLEM — Z48.815 ENCOUNTER FOR SURGICAL AFTERCARE FOLLOWING SURGERY OF DIGESTIVE SYSTEM: Status: ACTIVE | Noted: 2022-11-25

## 2024-06-17 PROBLEM — K91.2 POSTSURGICAL MALABSORPTION: Status: ACTIVE | Noted: 2024-06-17

## 2024-07-16 ENCOUNTER — TELEPHONE (OUTPATIENT)
Dept: FAMILY MEDICINE CLINIC | Facility: CLINIC | Age: 34
End: 2024-07-16

## 2024-07-17 ENCOUNTER — TELEPHONE (OUTPATIENT)
Dept: BARIATRICS | Facility: CLINIC | Age: 34
End: 2024-07-17

## 2024-07-17 NOTE — TELEPHONE ENCOUNTER
Called [employer] to advise that the provider Tahira jones be in the office on 08/20 and that she opened 07/19, no answer no vm to lm.

## 2024-08-19 ENCOUNTER — OFFICE VISIT (OUTPATIENT)
Dept: CARDIOLOGY CLINIC | Facility: CLINIC | Age: 34
End: 2024-08-19
Payer: COMMERCIAL

## 2024-08-19 VITALS
HEIGHT: 71 IN | BODY MASS INDEX: 40.68 KG/M2 | SYSTOLIC BLOOD PRESSURE: 130 MMHG | OXYGEN SATURATION: 97 % | WEIGHT: 290.6 LBS | DIASTOLIC BLOOD PRESSURE: 70 MMHG | HEART RATE: 87 BPM

## 2024-08-19 DIAGNOSIS — G47.33 OSA (OBSTRUCTIVE SLEEP APNEA): Primary | ICD-10-CM

## 2024-08-19 DIAGNOSIS — Z82.49 FAMILY HISTORY OF HEART DISEASE: ICD-10-CM

## 2024-08-19 DIAGNOSIS — Z82.49 FAMILY HISTORY OF CARDIAC ARREST: ICD-10-CM

## 2024-08-19 DIAGNOSIS — Z98.84 HISTORY OF BARIATRIC SURGERY: ICD-10-CM

## 2024-08-19 PROCEDURE — 93000 ELECTROCARDIOGRAM COMPLETE: CPT | Performed by: INTERNAL MEDICINE

## 2024-08-19 PROCEDURE — 99244 OFF/OP CNSLTJ NEW/EST MOD 40: CPT | Performed by: INTERNAL MEDICINE

## 2024-08-19 RX ORDER — ATORVASTATIN CALCIUM 40 MG/1
40 TABLET, FILM COATED ORAL DAILY
Qty: 90 TABLET | Refills: 4 | Status: SHIPPED | OUTPATIENT
Start: 2024-08-19

## 2024-08-19 NOTE — PROGRESS NOTES
Consultation - Cardiology   Tramaine Mijares 34 y.o. male MRN: [id number]  Unit/Bed#:  Encounter: [id number]  Physician Requesting Consult: No att. providers found  Reason for Consult / Principal Problem: Cardiac evaluation    Assessment:  FH of cardiac arrest  JANET      Plan:  Check FLP.  Start Lipitor 40 mg daily  ECHO / Regular EST  Increase exercise / weight reduction   RTO 6 months    History of Present Illness     HPI: Tramaine Mijares is a 34 y.o. year old male who denies any past cardiac history. He denies HTN, DM, smoking. He does not know his cholesterol levels.  His father just past of cardiac arrest. It sounds like he was having anginal symptoms for a few days prior. His grandmother had several coronary stents. His grandfather passed at age 59 of heart disease.  He is less active and has put on weight. He is 290 lbs.    He denies CP, SOB, LE edema.    ECG  : NSR, IRBBB    /70          Review of Systems:    Alert awake oriented, comfortable, denies any complaints  No fevers chills nausea vomiting  No weakness, dizziness, seizures  no cough, shortness of breath, or wheezing  Denies any palpitations, chest pain, diaphoresis  Denies leg edema, pain or paresthesias  Denies any skin rashes  Denies abdominal pain, bloody stools, masses  Denies any depression or suicidal ideations      Historical Information   Past Medical History:   Diagnosis Date    Clotting disorder (HCC)     Sleep apnea      Past Surgical History:   Procedure Laterality Date    HERNIA REPAIR      LAPAROSCOPIC GASTRIC BANDING      approx 09-10    NASAL FRACTURE SURGERY  2011    WRIST SURGERY       Social History     Substance and Sexual Activity   Alcohol Use Yes    Alcohol/week: 1.0 - 2.0 standard drink of alcohol    Types: 1 - 2 Standard drinks or equivalent per week     Social History     Substance and Sexual Activity   Drug Use Never     Social History     Tobacco Use   Smoking Status Former    Current packs/day: 0.00    Average  "packs/day: 0.3 packs/day for 1 year (0.3 ttl pk-yrs)    Types: Cigarettes    Start date:     Quit date:     Years since quittin.6   Smokeless Tobacco Never     Family History: non-contributory    Meds/Allergies   all current active meds have been reviewed  No Known Allergies    Objective   Vitals: Blood pressure 130/70, pulse 87, height 5' 11\" (1.803 m), weight 132 kg (290 lb 9.6 oz), SpO2 97%., Body mass index is 40.53 kg/m².,   Weight (last 2 days)       Date/Time Weight    24 1336 132 (290.6)                      Physical Exam:  GEN: Tramaine Mijares appears well, alert and oriented x 3, pleasant and cooperative   HEENT: pupils equal, round, and reactive to light; extraocular muscles intact  NECK: supple, no carotid bruits   HEART: regular rhythm, normal S1 and S2, no murmurs, clicks, gallops or rubs   LUNGS: clear to auscultation bilaterally; no wheezes, rales, or rhonchi   ABDOMEN: normal bowel sounds, soft, no tenderness, no distention  EXTREMITIES: peripheral pulses normal; no clubbing, cyanosis, or edema  NEURO: no focal findings   SKIN: normal without suspicious lesions on exposed skin    Lab Results:   No visits with results within 1 Day(s) from this visit.   Latest known visit with results is:   Admission on 2024, Discharged on 2024   Component Date Value Ref Range Status    WBC 2024 6.12  4.31 - 10.16 Thousand/uL Final    RBC 2024 5.24  3.88 - 5.62 Million/uL Final    Hemoglobin 2024 15.5  12.0 - 17.0 g/dL Final    Hematocrit 2024 46.7  36.5 - 49.3 % Final    MCV 2024 89  82 - 98 fL Final    MCH 2024 29.6  26.8 - 34.3 pg Final    MCHC 2024 33.2  31.4 - 37.4 g/dL Final    RDW 2024 13.3  11.6 - 15.1 % Final    MPV 2024 9.1  8.9 - 12.7 fL Final    Platelets 2024 263  149 - 390 Thousands/uL Final    nRBC 2024 0  /100 WBCs Final    Segmented % 2024 62  43 - 75 % Final    Immature Grans % 2024 0  0 - 2 " % Final    Lymphocytes % 06/09/2024 22  14 - 44 % Final    Monocytes % 06/09/2024 14 (H)  4 - 12 % Final    Eosinophils Relative 06/09/2024 1  0 - 6 % Final    Basophils Relative 06/09/2024 1  0 - 1 % Final    Absolute Neutrophils 06/09/2024 3.82  1.85 - 7.62 Thousands/µL Final    Absolute Immature Grans 06/09/2024 0.01  0.00 - 0.20 Thousand/uL Final    Absolute Lymphocytes 06/09/2024 1.36  0.60 - 4.47 Thousands/µL Final    Absolute Monocytes 06/09/2024 0.84  0.17 - 1.22 Thousand/µL Final    Eosinophils Absolute 06/09/2024 0.06  0.00 - 0.61 Thousand/µL Final    Basophils Absolute 06/09/2024 0.03  0.00 - 0.10 Thousands/µL Final    Sodium 06/09/2024 137  135 - 147 mmol/L Final    Potassium 06/09/2024 4.1  3.5 - 5.3 mmol/L Final    Chloride 06/09/2024 104  96 - 108 mmol/L Final    CO2 06/09/2024 27  21 - 32 mmol/L Final    ANION GAP 06/09/2024 6  4 - 13 mmol/L Final    BUN 06/09/2024 15  5 - 25 mg/dL Final    Creatinine 06/09/2024 1.02  0.60 - 1.30 mg/dL Final    Standardized to IDMS reference method    Glucose 06/09/2024 88  65 - 140 mg/dL Final    If the patient is fasting, the ADA then defines impaired fasting glucose as > 100 mg/dL and diabetes as > or equal to 123 mg/dL.    Calcium 06/09/2024 9.3  8.4 - 10.2 mg/dL Final    AST 06/09/2024 24  13 - 39 U/L Final    ALT 06/09/2024 30  7 - 52 U/L Final    Specimen collection should occur prior to Sulfasalazine administration due to the potential for falsely depressed results.     Alkaline Phosphatase 06/09/2024 60  34 - 104 U/L Final    Total Protein 06/09/2024 7.0  6.4 - 8.4 g/dL Final    Albumin 06/09/2024 4.2  3.5 - 5.0 g/dL Final    Total Bilirubin 06/09/2024 0.79  0.20 - 1.00 mg/dL Final    Use of this assay is not recommended for patients undergoing treatment with eltrombopag due to the potential for falsely elevated results.  N-acetyl-p-benzoquinone imine (metabolite of Acetaminophen) will generate erroneously low results in samples for patients that have taken  an overdose of Acetaminophen.    eGFR 06/09/2024 95  ml/min/1.73sq m Final    LACTIC ACID 06/09/2024 0.6  0.5 - 2.0 mmol/L Final    Lipase 06/09/2024 19  11 - 82 u/L Final                       Imaging: I have personally reviewed pertinent reports.

## 2024-08-29 ENCOUNTER — OFFICE VISIT (OUTPATIENT)
Dept: BARIATRICS | Facility: CLINIC | Age: 34
End: 2024-08-29
Payer: COMMERCIAL

## 2024-08-29 VITALS
WEIGHT: 289.5 LBS | SYSTOLIC BLOOD PRESSURE: 124 MMHG | HEART RATE: 81 BPM | DIASTOLIC BLOOD PRESSURE: 78 MMHG | OXYGEN SATURATION: 97 % | RESPIRATION RATE: 18 BRPM | HEIGHT: 71 IN | BODY MASS INDEX: 40.53 KG/M2

## 2024-08-29 DIAGNOSIS — Z48.815 ENCOUNTER FOR SURGICAL AFTERCARE FOLLOWING SURGERY OF DIGESTIVE SYSTEM: Primary | ICD-10-CM

## 2024-08-29 DIAGNOSIS — Z98.84 BARIATRIC SURGERY STATUS: ICD-10-CM

## 2024-08-29 DIAGNOSIS — Z98.84 H/O LAPAROSCOPIC ADJUSTABLE GASTRIC BANDING: ICD-10-CM

## 2024-08-29 PROCEDURE — 99203 OFFICE O/P NEW LOW 30 MIN: CPT | Performed by: PHYSICIAN ASSISTANT

## 2024-08-29 NOTE — PROGRESS NOTES
Assessment/Plan:      Diagnoses and all orders for this visit:    Encounter for surgical aftercare following surgery of digestive system    Bariatric surgery status    H/O laparoscopic adjustable gastric banding  -     FL UPPER GI UGI; Future           Date of surgery: 2009  Procedure: Lap Band  Performing surgeon: FAMILIA FLORES     Highest Weight Pre-procedure:     330.0 pounds  Lowest weight Post-procedure:    215.0 pounds     Initial Weight - 330.0  Current Weight -289.0  Total Body Weight Loss (EWL)- N/A  EWL% - N/A  TWB % - N/A               Patient status post gastric band placement in 2009. 2 months ago pt states is toddler was jumping on him and moved the band. After this occurred, Patient states he felt a getting stuck at the lower chest/upper abdomen region. He was seen in the ER which imaging showing:    CT abd/pelvis  IMPRESSION:     Slightly abnormal positioning of a gastric band with significant stomal stenosis. The esophagus is dilated and there is no passage of oral contrast into the distal stomach.     The study was marked in EPIC for immediate notification.    XR abdomen  IMPRESSION:  Gastric lap band is present.  Oral contrast has progressed into the stomach and proximal duodenum.     Since being seen in the ER pt states he is able to tolerate food and drink much easier however as a result has gained about 30 lbs. He is looking to get his band removed however not intersted in sleeve or bypass at this time. Would be interested in potential use of medication after band removal.    Plan: get UGI and follow up with surgeon to discuss removal       Subjective:     Patient ID: Tramaine Mijares is a 34 y.o. male.    HPI new patient gastric band     Review of Systems   Constitutional: Negative.    Respiratory: Negative.     Cardiovascular: Negative.    Gastrointestinal: Negative.    Neurological: Negative.    Psychiatric/Behavioral: Negative.           Objective:     Physical Exam  Vitals and nursing  note reviewed.   Constitutional:       Appearance: Normal appearance. He is obese.   HENT:      Head: Normocephalic and atraumatic.   Eyes:      Extraocular Movements: Extraocular movements intact.   Cardiovascular:      Rate and Rhythm: Normal rate.   Pulmonary:      Effort: Pulmonary effort is normal.   Musculoskeletal:         General: Normal range of motion.      Cervical back: Normal range of motion.   Skin:     General: Skin is warm and dry.   Neurological:      General: No focal deficit present.      Mental Status: He is alert and oriented to person, place, and time.   Psychiatric:         Mood and Affect: Mood normal.

## 2024-08-29 NOTE — PROGRESS NOTES
Date of surgery: 2009  Procedure: Lap Band  Performing surgeon: FAMILIA FLORES    Highest Weight Pre-procedure:     330.0 pounds  Lowest weight Post-procedure:    215.0 pounds    Initial Weight - 330.0  Current Weight -289.0  Total Body Weight Loss (EWL)- N/A  EWL% - N/A  TWB % - N/A

## 2024-08-30 ENCOUNTER — OFFICE VISIT (OUTPATIENT)
Dept: BARIATRICS | Facility: CLINIC | Age: 34
End: 2024-08-30
Payer: COMMERCIAL

## 2024-08-30 VITALS
HEART RATE: 74 BPM | DIASTOLIC BLOOD PRESSURE: 76 MMHG | WEIGHT: 290 LBS | SYSTOLIC BLOOD PRESSURE: 138 MMHG | TEMPERATURE: 96.9 F | OXYGEN SATURATION: 97 % | HEIGHT: 71 IN | BODY MASS INDEX: 40.6 KG/M2 | RESPIRATION RATE: 18 BRPM

## 2024-08-30 DIAGNOSIS — Z98.84 H/O LAPAROSCOPIC ADJUSTABLE GASTRIC BANDING: ICD-10-CM

## 2024-08-30 DIAGNOSIS — E66.01 OBESITY, CLASS III, BMI 40-49.9 (MORBID OBESITY) (HCC): Primary | ICD-10-CM

## 2024-08-30 PROCEDURE — 99203 OFFICE O/P NEW LOW 30 MIN: CPT | Performed by: SURGERY

## 2024-08-30 RX ORDER — TIRZEPATIDE 2.5 MG/.5ML
2.5 INJECTION, SOLUTION SUBCUTANEOUS WEEKLY
Qty: 2 ML | Refills: 0 | Status: SHIPPED | OUTPATIENT
Start: 2024-08-30 | End: 2024-09-27

## 2024-08-30 NOTE — PROGRESS NOTES
Date of surgery: 09-10  Procedure: Lap   Performing surgeon: N/A    Initial Weight - 289.5 lb   Current Weight -290.0 lb   Mateo Weight - 289.0 lb  Total Body Weight Loss (EWL)-  -0.4  EWL% - 0%  TWB % - 0%

## 2024-08-30 NOTE — PROGRESS NOTES
OFFICE VISIT - BARIATRIC SURGERY  Tramaine Mijares 34 y.o. male MRN: [id number]  Unit/Bed#:  Encounter: [id number]      HPI:  Tramaine Mijares is a 34 y.o. male BMI 41 s/p lap band at [facility] by Dr. Miller in 2009. Patient presented to ED on 6/9/24 for dysphagia d/t lap band slippage. Band was drained 7 cc and he was able to return home.    Patient is here for possible lap band removal    Subjective   Onset of dysphagia suspected after toddler jumped on him 2 months ago. Presented to ED with CT findings of slippage of the band and no passage of contrast. 7 cc was drained from the band with resolution of sx.    He denies reflux sx and able to tolerate solids. He has since regained 30 lbs. He would not be interested in keeping the band    Surg hx: Groin hernia as a child    Review of Systems   Constitutional:  Negative for chills and fever.   HENT:  Negative for ear pain and sore throat.    Eyes:  Negative for pain and visual disturbance.   Respiratory:  Negative for cough and shortness of breath.    Cardiovascular:  Negative for chest pain and palpitations.   Gastrointestinal:  Negative for abdominal pain and vomiting.   Genitourinary:  Negative for dysuria and hematuria.   Musculoskeletal:  Negative for arthralgias and back pain.   Skin:  Negative for color change and rash.   Neurological:  Negative for seizures and syncope.   All other systems reviewed and are negative.      Historical Information   Past Medical History:   Diagnosis Date    Clotting disorder (HCC)     Sleep apnea      Past Surgical History:   Procedure Laterality Date    HERNIA REPAIR      LAPAROSCOPIC GASTRIC BANDING      approx 09-10    NASAL FRACTURE SURGERY  2011    WRIST SURGERY       Social History   Social History     Substance and Sexual Activity   Alcohol Use Yes    Alcohol/week: 1.0 - 2.0 standard drink of alcohol    Types: 1 - 2 Standard drinks or equivalent per week    Comment: occaisally     Social History     Substance and Sexual  "Activity   Drug Use Never     Social History     Tobacco Use   Smoking Status Former    Current packs/day: 0.00    Average packs/day: 0.3 packs/day for 1 year (0.3 ttl pk-yrs)    Types: Cigarettes    Start date:     Quit date:     Years since quittin.6   Smokeless Tobacco Never       Objective       Current Vitals:   Blood Pressure: 138/76 (24 1047)  Pulse: 74 (24)  Temperature: (!) 96.9 °F (36.1 °C) (24)  Temp Source: Tympanic (24)  Respirations: 18 (24)  Height: 5' 10.5\" (179.1 cm) (24)  Weight - Scale: 132 kg (290 lb) (24)  SpO2: 97 % (24)    Invasive Devices       None                   Physical Exam  Constitutional:       Appearance: Normal appearance.   HENT:      Head: Normocephalic.      Nose: Nose normal.   Eyes:      Extraocular Movements: Extraocular movements intact.   Cardiovascular:      Rate and Rhythm: Normal rate.   Pulmonary:      Effort: Pulmonary effort is normal.   Abdominal:      General: Abdomen is flat.      Palpations: Abdomen is soft.   Musculoskeletal:         General: Normal range of motion.   Skin:     General: Skin is warm.   Psychiatric:         Mood and Affect: Mood normal.         Behavior: Behavior normal.           Pathology, and Other Studies: I have personally reviewed pertinent reports.        Workup includes:   CT ABDOMEN AND PELVIS WITH IV CONTRAST     INDICATION:   vomiting after PO intake, h/o lap band placed      COMPARISON: None.     TECHNIQUE:  CT examination of the abdomen and pelvis was performed. Dual-energy technique was utilized. Axial, sagittal, and coronal 2D reformatted images were created from the source data and submitted for interpretation.     Radiation dose length product (DLP) for this visit:  926 mGy-cm .  This examination, like all CT scans performed in the [facility], was performed utilizing techniques to minimize radiation dose exposure, " including the use of iterative   reconstruction and automated exposure control.     IV Contrast:  50 mL of iohexol (OMNIPAQUE)  Enteric Contrast: Enteric contrast was administered.        FINDINGS:     ABDOMEN     LOWER CHEST: No clinically significant abnormality is identified in the visualized lower chest. No consolidation or effusion.     LIVER: Normal size and morphology. No suspicious lesion.     BILIARY: No intrahepatic biliary ductal dilatation. Normal caliber common bile duct.     GALLBLADDER: No calcified gallstones. Normal wall thickness. No pericholecystic inflammatory changes.     SPLEEN: Within normal limits. No suspicious lesion. Normal spleen size.     PANCREAS: Pancreatic parenchyma is within normal limits. No main pancreatic ductal dilatation. No pancreatic/peripancreatic inflammation.     ADRENAL GLANDS: Within normal limits.     KIDNEYS/URETERS: Normal size and position. Symmetric enhancement. No suspicious lesion. No calcified stones or hydronephrosis. Ureters within normal limits. Small simple cyst at the upper pole of the right kidney.     STOMACH AND BOWEL: Postsurgical changes of gastric banding. There is slightly abnormal positioning of the gastric band with slight increase in the phi angle (65 degrees) and mild obliquity in the AP projection. There is diffuse dilation of the esophagus   and top-normal size of the gastric pouch within a small hiatal hernia. No eccentric dilation of the gastric pouch. Contrast is seen within the esophagus and gastric pouch with no passage into the distal stomach. Findings compatible with significant   stomal stenosis of the band. Normal caliber small bowel. Normal caliber large bowel. No evidence of active small or large bowel inflammatory process.     APPENDIX: Normal air-filled appendix.     ABDOMINOPELVIC CAVITY: No ascites. No intraperitoneal free air. No lymphadenopathy. No retroperitoneal hematoma.     VESSELS: Normal caliber abdominal aorta with no  detectable atherosclerotic plaque. The celiac, SMA, and MARINA are patent. The main, right, and left portal veins are patent. The SMV and splenic vein are patent. The hepatic veins are patent.        PELVIS     REPRODUCTIVE ORGANS: Normal prostate. Symmetric seminal vesicles.     URINARY BLADDER: Within normal limits. No calculi.     ABDOMINAL WALL/INGUINAL REGIONS: Mild bilateral gynecomastia. Tiny fat-containing umbilical hernia.     BONES: Vertebral body height is maintained. No acute fracture or destructive osseous lesion.        IMPRESSION:     Slightly abnormal positioning of a gastric band with significant stomal stenosis. The esophagus is dilated and there is no passage of oral contrast into the distal stomach.    XR ABDOMEN 1 VW PORTABLE     INDICATION: lap band, NV, see if lap band allows contents through inot stomach.     COMPARISON: None     FINDINGS: Gastric lap band is present.     Oral contrast is present in the stomach and proximal duodenum. No residual contrast in the esophagus.     No evidence of pneumoperitoneum on this supine study. Upright or left lateral decubitus imaging is more sensitive to detect subtle free air in the appropriate setting.     No pathologic calcification or soft tissue mass.     Clear lung bases.     Bones are unremarkable for the patient's age.     IMPRESSION:     Gastric lap band is present.     Oral contrast has progressed into the stomach and proximal duodenum.  --------------------------------------------------------------------    Assessment/PLAN:    Tramaine Mijares is a 34 y.o. male BMI 41 s/p lap band at [facility] by Dr. Miller in 2009. Patient presented to ED on 6/9/24 for dysphagia d/t lap band slippage. Band was drained 7 cc and he was able to return home.    Patient is here for possible lap band removal. Patient has a slipped gastric band.     -Upper GI  -Lap Band Removal  -CBC/CMP ordered    After lap band removal, will start him on Level 2 (sleeve vs bypass). Will  order him Zepbound now. He should not start this until after lap band removal.     Shun Lopez  Bariatric Surgery  9/2/2024  12:01 PM

## 2024-09-03 ENCOUNTER — TELEPHONE (OUTPATIENT)
Dept: BARIATRICS | Facility: CLINIC | Age: 34
End: 2024-09-03

## 2024-09-03 NOTE — TELEPHONE ENCOUNTER
Edith Ayala,  I tired to reach you by phone unsuccessfully. To have Dr. Gen Varghese remove your gastric band I will need to have a written clearance from your cardiologist Dr. Gilbert and the bloodwork in your chart will also need to be completed. When this is done please contact me to discuss a surgery date with Dr. Gen Varghese. Have a good day!

## 2024-09-16 ENCOUNTER — RA CDI HCC (OUTPATIENT)
Dept: OTHER | Facility: HOSPITAL | Age: 34
End: 2024-09-16

## 2024-09-16 PROBLEM — E66.01 SEVERE OBESITY (BMI >= 40) (HCC): Status: ACTIVE | Noted: 2024-09-16

## 2024-09-23 ENCOUNTER — OFFICE VISIT (OUTPATIENT)
Dept: FAMILY MEDICINE CLINIC | Facility: CLINIC | Age: 34
End: 2024-09-23
Payer: COMMERCIAL

## 2024-09-23 VITALS
DIASTOLIC BLOOD PRESSURE: 80 MMHG | HEIGHT: 71 IN | OXYGEN SATURATION: 98 % | SYSTOLIC BLOOD PRESSURE: 128 MMHG | BODY MASS INDEX: 40.6 KG/M2 | WEIGHT: 290 LBS | HEART RATE: 84 BPM

## 2024-09-23 DIAGNOSIS — Z13.29 SCREENING FOR THYROID DISORDER: ICD-10-CM

## 2024-09-23 DIAGNOSIS — Z98.84 H/O LAPAROSCOPIC ADJUSTABLE GASTRIC BANDING: ICD-10-CM

## 2024-09-23 DIAGNOSIS — Z00.00 ENCOUNTER FOR SCREENING AND PREVENTATIVE CARE: Primary | ICD-10-CM

## 2024-09-23 DIAGNOSIS — G47.33 OSA (OBSTRUCTIVE SLEEP APNEA): ICD-10-CM

## 2024-09-23 DIAGNOSIS — Z82.49 FAMILY HISTORY OF HEART DISEASE: ICD-10-CM

## 2024-09-23 DIAGNOSIS — Z82.49 FAMILY HISTORY OF CARDIAC ARREST: ICD-10-CM

## 2024-09-23 DIAGNOSIS — Z13.220 SCREENING CHOLESTEROL LEVEL: ICD-10-CM

## 2024-09-23 PROCEDURE — 99395 PREV VISIT EST AGE 18-39: CPT | Performed by: FAMILY MEDICINE

## 2024-09-23 NOTE — PROGRESS NOTES
Adult Annual Physical  Name: Tramaine Mijares      : 1990      MRN: [id number]  Encounter Provider: Enrico Schmitt DO  Encounter Date: 2024   Encounter department: [facility]    Assessment & Plan  Encounter for screening and preventative care  Patient opresents for yearly physical.  He will be going through his insurance/ job to obtain labs.  [facility]. Overall the patient is coping with the stressful last 6 months with new child, loss of parent, and moving.  Will continue to monitor mental health and let me know if there is anything that I can do to help mentally or physically.   Orders:    Lipid panel; Future    TSH, 3rd generation with Free T4 reflex; Future    Screening cholesterol level  Screening cholesterol levels, prior labs did not include this, therefore if not performed by work labs order is placed.   Orders:    Lipid panel; Future    Screening for thyroid disorder  Screening thyroid levels, prior labs did not include this, therefore if not performed by work labs order is placed.   Orders:    TSH, 3rd generation with Free T4 reflex; Future    Family history of cardiac arrest  Patient had loss of parent earlier this year and has followed up with cardiology to ensure proper steps taken to reduce his risk.  He was offered atorvastatin but has been holding off on using it since he would like to wait until necessary and not just preventative. He has been attempting to decrease risk factors such as blood pressure, hyperlipidemia, and weight.        Family history of heart disease         H/O laparoscopic adjustable gastric banding  Patient has history of gastric banding.  They have loosened the band due to slippage.  He will be following with Dr. Gen Garcia and they will be starting Zepbound and he will be going through weight management classes through bariatrics.        JANET (obstructive sleep  apnea)  Compliant on CPAP.  Looking to go for inspire and improve compliance.        Immunizations and preventive care screenings were discussed with patient today. Appropriate education was printed on patient's after visit summary.    Counseling:  Alcohol/drug use: discussed moderation in alcohol intake, the recommendations for healthy alcohol use, and avoidance of illicit drug use.  Dental Health: discussed importance of regular tooth brushing, flossing, and dental visits.  Sexual health: discussed sexually transmitted diseases, partner selection, use of condoms, avoidance of unintended pregnancy, and contraceptive alternatives.  Exercise: the importance of regular exercise/physical activity was discussed. Recommend exercise 3-5 times per week for at least 30 minutes.          History of Present Illness     Adult Annual Physical:  Patient presents for annual physical. Patient presents today for annual physical.  He has had multiple stressful events over the course of the last 6 months.  Unfortunately his father passed due to cardiovascular issues and cardiac arrest.  Additionally he has moved and had a baby within the past month.     With this the patient has also been attempting to lose weight.  They did release some of his gastric band to avoid complications with it slipping upwards.  He has been following with bariatrics and will be going through a revision as well as starting on Zepbound in the next few weeks.  He has started a diet low carb over the last week and plans to continue this diet while working with weight management.    He also saw the ear nose and throat to help review with inspire and other options for sleep apnea.  Once he loses some weight he should be a good candidate..     Diet and Physical Activity:  - Diet/Nutrition: well balanced diet, portion control and consuming 3-5 servings of fruits/vegetables daily.  - Exercise: walking.    General Health:  - Sleep: sleeps poorly. baby at home 1  month old  - Hearing: normal hearing bilateral ears.  - Vision: no vision problems.  - Dental: regular dental visits.     Health:  - History of STDs: no.   - Urinary symptoms: none.     Review of Systems   Constitutional:  Negative for chills, fever and unexpected weight change.   HENT:  Negative for congestion, ear discharge, ear pain, hearing loss, postnasal drip, rhinorrhea, sinus pressure, sinus pain and sore throat.    Eyes:  Negative for visual disturbance.   Respiratory:  Negative for cough, chest tightness and shortness of breath.    Cardiovascular:  Negative for chest pain and palpitations.   Gastrointestinal:  Negative for abdominal pain, blood in stool, constipation, diarrhea, nausea and vomiting.   Genitourinary:  Negative for dysuria and frequency.   Skin:  Negative for rash and wound.   Neurological:  Negative for dizziness, light-headedness and headaches.   Psychiatric/Behavioral:  Negative for self-injury and suicidal ideas.      Medical History Reviewed by provider this encounter:  Tobacco  Allergies  Meds  Problems  Surg Hx  Fam Hx  Soc Hx      Current Outpatient Medications on File Prior to Visit   Medication Sig Dispense Refill    tirzepatide (Zepbound) 2.5 mg/0.5 mL auto-injector Inject 0.5 mL (2.5 mg total) under the skin once a week for 28 days 2 mL 0    atorvastatin (LIPITOR) 40 mg tablet Take 1 tablet (40 mg total) by mouth daily (Patient not taking: Reported on 8/29/2024) 90 tablet 4    neomycin-polymyxin-hydrocortisone (CORTISPORIN) 0.35%-10,000 units/mL-1% otic suspension Administer 4 drops into both ears 4 (four) times a day (Patient not taking: Reported on 8/19/2024) 10 mL 0     No current facility-administered medications on file prior to visit.      Social History     Tobacco Use    Smoking status: Former     Current packs/day: 0.00     Average packs/day: 0.3 packs/day for 1 year (0.3 ttl pk-yrs)     Types: Cigarettes     Start date: 2012     Quit date: 2013     Years since  "quittin.7    Smokeless tobacco: Never   Vaping Use    Vaping status: Former   Substance and Sexual Activity    Alcohol use: Not Currently     Comment: rarely    Drug use: Never    Sexual activity: Yes     Partners: Female     Comment: wife pregnant       Objective     /80   Pulse 84   Ht 5' 10.5\" (1.791 m)   Wt 132 kg (290 lb)   SpO2 98%   BMI 41.02 kg/m²     Physical Exam  Constitutional:       General: He is not in acute distress.     Appearance: Normal appearance. He is obese. He is not ill-appearing, toxic-appearing or diaphoretic.   HENT:      Head: Normocephalic and atraumatic.      Right Ear: Tympanic membrane, ear canal and external ear normal. There is no impacted cerumen.      Left Ear: Tympanic membrane, ear canal and external ear normal. There is no impacted cerumen.      Nose: Nose normal. No congestion or rhinorrhea.      Mouth/Throat:      Mouth: Mucous membranes are moist.      Pharynx: Oropharynx is clear. No oropharyngeal exudate or posterior oropharyngeal erythema.   Eyes:      General:         Right eye: No discharge.         Left eye: No discharge.      Extraocular Movements: Extraocular movements intact.      Pupils: Pupils are equal, round, and reactive to light.   Cardiovascular:      Rate and Rhythm: Normal rate and regular rhythm.      Heart sounds: Normal heart sounds. No murmur heard.     No friction rub. No gallop.   Pulmonary:      Effort: Pulmonary effort is normal. No respiratory distress.      Breath sounds: Normal breath sounds. No stridor. No wheezing, rhonchi or rales.   Abdominal:      General: Bowel sounds are normal. There is no distension.      Palpations: Abdomen is soft.      Tenderness: There is no abdominal tenderness.   Musculoskeletal:      Cervical back: Neck supple. No tenderness.   Lymphadenopathy:      Cervical: No cervical adenopathy.   Skin:     General: Skin is warm.   Neurological:      Mental Status: He is alert.      Sensory: No sensory deficit " (light touch in all 4 extremities.).      Motor: No weakness (5/5 in all 4 extremities).      Deep Tendon Reflexes: Reflexes normal (2/4, intact, C5, C6, L4, S1).

## 2024-10-01 ENCOUNTER — PATIENT MESSAGE (OUTPATIENT)
Dept: FAMILY MEDICINE CLINIC | Facility: CLINIC | Age: 34
End: 2024-10-01

## 2024-10-01 DIAGNOSIS — Z13.220 SCREENING CHOLESTEROL LEVEL: Primary | ICD-10-CM

## 2024-10-01 DIAGNOSIS — Z13.1 SCREENING FOR DIABETES MELLITUS: ICD-10-CM

## 2024-10-01 DIAGNOSIS — Z00.00 ENCOUNTER FOR SCREENING AND PREVENTATIVE CARE: ICD-10-CM

## 2024-10-17 ENCOUNTER — HOSPITAL ENCOUNTER (OUTPATIENT)
Dept: NON INVASIVE DIAGNOSTICS | Facility: CLINIC | Age: 34
Discharge: HOME | End: 2024-10-17

## 2024-10-22 ENCOUNTER — HOSPITAL ENCOUNTER (OUTPATIENT)
Dept: RADIOLOGY | Facility: HOSPITAL | Age: 34
Discharge: HOME | End: 2024-10-22
Payer: COMMERCIAL

## 2024-10-22 ENCOUNTER — APPOINTMENT (OUTPATIENT)
Dept: LAB | Facility: CLINIC | Age: 34
End: 2024-10-22
Payer: COMMERCIAL

## 2024-10-22 DIAGNOSIS — Z13.29 SCREENING FOR THYROID DISORDER: ICD-10-CM

## 2024-10-22 DIAGNOSIS — Z98.84 H/O LAPAROSCOPIC ADJUSTABLE GASTRIC BANDING: ICD-10-CM

## 2024-10-22 DIAGNOSIS — Z13.1 SCREENING FOR DIABETES MELLITUS: ICD-10-CM

## 2024-10-22 DIAGNOSIS — Z13.220 SCREENING CHOLESTEROL LEVEL: ICD-10-CM

## 2024-10-22 DIAGNOSIS — Z82.49 FAMILY HISTORY OF HEART DISEASE: ICD-10-CM

## 2024-10-22 DIAGNOSIS — Z00.00 ENCOUNTER FOR SCREENING AND PREVENTATIVE CARE: ICD-10-CM

## 2024-10-22 LAB
ALBUMIN SERPL BCG-MCNC: 4.5 G/DL (ref 3.5–5)
ALP SERPL-CCNC: 42 U/L (ref 34–104)
ALT SERPL W P-5'-P-CCNC: 25 U/L (ref 7–52)
ANION GAP SERPL CALCULATED.3IONS-SCNC: 11 MMOL/L (ref 4–13)
AST SERPL W P-5'-P-CCNC: 20 U/L (ref 13–39)
BILIRUB SERPL-MCNC: 0.86 MG/DL (ref 0.2–1)
BUN SERPL-MCNC: 14 MG/DL (ref 5–25)
CALCIUM SERPL-MCNC: 9.3 MG/DL (ref 8.4–10.2)
CHLORIDE SERPL-SCNC: 104 MMOL/L (ref 96–108)
CHOLEST SERPL-MCNC: 139 MG/DL
CO2 SERPL-SCNC: 24 MMOL/L (ref 21–32)
CREAT SERPL-MCNC: 1 MG/DL (ref 0.6–1.3)
ERYTHROCYTE [DISTWIDTH] IN BLOOD BY AUTOMATED COUNT: 13.3 % (ref 11.6–15.1)
GFR SERPL CREATININE-BSD FRML MDRD: 97 ML/MIN/1.73SQ M
GLUCOSE P FAST SERPL-MCNC: 86 MG/DL (ref 65–99)
HCT VFR BLD AUTO: 49 % (ref 36.5–49.3)
HDLC SERPL-MCNC: 32 MG/DL
HGB BLD-MCNC: 16.2 G/DL (ref 12–17)
LDLC SERPL CALC-MCNC: 78 MG/DL (ref 0–100)
MCH RBC QN AUTO: 29.8 PG (ref 26.8–34.3)
MCHC RBC AUTO-ENTMCNC: 33.1 G/DL (ref 31.4–37.4)
MCV RBC AUTO: 90 FL (ref 82–98)
NONHDLC SERPL-MCNC: 107 MG/DL
PLATELET # BLD AUTO: 302 THOUSANDS/UL (ref 149–390)
PMV BLD AUTO: 10.2 FL (ref 8.9–12.7)
POTASSIUM SERPL-SCNC: 4.4 MMOL/L (ref 3.5–5.3)
PROT SERPL-MCNC: 7.4 G/DL (ref 6.4–8.4)
RBC # BLD AUTO: 5.43 MILLION/UL (ref 3.88–5.62)
SODIUM SERPL-SCNC: 139 MMOL/L (ref 135–147)
TRIGL SERPL-MCNC: 146 MG/DL
TSH SERPL DL<=0.05 MIU/L-ACNC: 1.73 UIU/ML (ref 0.45–4.5)
WBC # BLD AUTO: 6.47 THOUSAND/UL (ref 4.31–10.16)

## 2024-10-22 PROCEDURE — 80061 LIPID PANEL: CPT

## 2024-10-22 PROCEDURE — 74240 X-RAY XM UPR GI TRC 1CNTRST: CPT

## 2024-10-22 PROCEDURE — 80053 COMPREHEN METABOLIC PANEL: CPT

## 2024-10-22 PROCEDURE — 84443 ASSAY THYROID STIM HORMONE: CPT

## 2024-10-22 PROCEDURE — 36415 COLL VENOUS BLD VENIPUNCTURE: CPT

## 2024-10-22 PROCEDURE — 85027 COMPLETE CBC AUTOMATED: CPT

## 2024-10-23 ENCOUNTER — PATIENT MESSAGE (OUTPATIENT)
Dept: BARIATRICS | Facility: CLINIC | Age: 34
End: 2024-10-23

## 2024-10-23 NOTE — PATIENT COMMUNICATION
Attempting to contact patient to schedule appointment with Dr Gen Varghese for UGI review, however, patient voicemail is not setup.

## 2024-10-29 ENCOUNTER — PATIENT MESSAGE (OUTPATIENT)
Dept: FAMILY MEDICINE CLINIC | Facility: CLINIC | Age: 34
End: 2024-10-29

## 2024-10-29 ENCOUNTER — TELEPHONE (OUTPATIENT)
Dept: BARIATRICS | Facility: CLINIC | Age: 34
End: 2024-10-29

## 2024-10-29 NOTE — TELEPHONE ENCOUNTER
Patient called in to schedule follow up however unable to get through to office at this time. Patient works full time during the day and does not have service inside. Patient has off on 11/6 and if possible can make appt anytime for that day and can be notified via ITYZ. If not patient is asking if possible to send a ITYZ message with some available days and times and he will respond that way.

## 2024-10-30 ENCOUNTER — PATIENT MESSAGE (OUTPATIENT)
Dept: FAMILY MEDICINE CLINIC | Facility: CLINIC | Age: 34
End: 2024-10-30

## 2024-10-30 DIAGNOSIS — Z83.49 FAMILY HISTORY OF HEMOCHROMATOSIS: Primary | ICD-10-CM

## 2024-11-15 ENCOUNTER — OFFICE VISIT (OUTPATIENT)
Dept: BARIATRICS | Facility: CLINIC | Age: 34
End: 2024-11-15
Payer: COMMERCIAL

## 2024-11-15 VITALS
DIASTOLIC BLOOD PRESSURE: 80 MMHG | SYSTOLIC BLOOD PRESSURE: 124 MMHG | TEMPERATURE: 97.6 F | HEART RATE: 71 BPM | BODY MASS INDEX: 39.62 KG/M2 | WEIGHT: 283 LBS | HEIGHT: 71 IN | RESPIRATION RATE: 14 BRPM

## 2024-11-15 DIAGNOSIS — Z98.84 BARIATRIC SURGERY STATUS: Primary | ICD-10-CM

## 2024-11-15 DIAGNOSIS — Z98.84 H/O LAPAROSCOPIC ADJUSTABLE GASTRIC BANDING: ICD-10-CM

## 2024-11-15 PROCEDURE — 99213 OFFICE O/P EST LOW 20 MIN: CPT | Performed by: SURGERY

## 2024-11-15 NOTE — PROGRESS NOTES
OFFICE VISIT - BARIATRIC SURGERY  Jamie Mijares 34 y.o. male MRN: [id number]  Unit/Bed#:  Encounter: [id number]      HPI:  Jamie Mijares is a 34 y.o. male status post s/p lap band at [facility] by Dr. Miller in . Comes to the office today to discuss lap band removal. Patient presented to ED on 24 for dysphagia d/t lap band slippage. Band was drained 7 cc and he was able to return home.     Subjective     Patient states that since he was last seen he continues to not experience much significant reflux. He is hoping to trial Zepbound after band removal and has not filled the prescription yet.    Review of Systems   Constitutional:  Negative for chills and fever.   HENT:  Negative for ear pain and sore throat.    Eyes:  Negative for pain and visual disturbance.   Respiratory:  Negative for cough and shortness of breath.    Cardiovascular:  Negative for chest pain and palpitations.   Gastrointestinal:  Negative for abdominal pain and vomiting.   Genitourinary:  Negative for dysuria and hematuria.   Musculoskeletal:  Negative for arthralgias and back pain.   Skin:  Negative for color change and rash.   Neurological:  Negative for seizures and syncope.   All other systems reviewed and are negative.      Historical Information   Past Medical History:   Diagnosis Date    Clotting disorder (HCC)     Sleep apnea      Past Surgical History:   Procedure Laterality Date    HERNIA REPAIR      LAPAROSCOPIC GASTRIC BANDING      approx 09-10    NASAL FRACTURE SURGERY      WRIST SURGERY       Social History   Social History     Substance and Sexual Activity   Alcohol Use Not Currently    Comment: rarely     Social History     Substance and Sexual Activity   Drug Use Never     Social History     Tobacco Use   Smoking Status Former    Current packs/day: 0.00    Average packs/day: 0.3 packs/day for 1 year (0.3 ttl pk-yrs)    Types: Cigarettes    Start date:     Quit date: 2013    Years since quittin.8  "  Smokeless Tobacco Never       Objective       Current Vitals:   Blood Pressure: 124/80 (11/15/24 1524)  Pulse: 71 (11/15/24 1524)  Temperature: 97.6 °F (36.4 °C) (11/15/24 1524)  Temp Source: Tympanic (11/15/24 1524)  Respirations: 14 (11/15/24 1524)  Height: 5' 11\" (180.3 cm) (11/15/24 1524)  Weight - Scale: 128 kg (283 lb) (11/15/24 1524)    Invasive Devices       None                   Physical Exam  Constitutional:       General: He is not in acute distress.     Appearance: He is obese. He is not ill-appearing or toxic-appearing.   HENT:      Head: Normocephalic and atraumatic.      Right Ear: External ear normal.      Left Ear: External ear normal.      Nose: Nose normal.      Mouth/Throat:      Mouth: Mucous membranes are moist.      Pharynx: Oropharynx is clear.   Eyes:      Extraocular Movements: Extraocular movements intact.      Conjunctiva/sclera: Conjunctivae normal.   Cardiovascular:      Rate and Rhythm: Normal rate.   Pulmonary:      Effort: Pulmonary effort is normal. No respiratory distress.      Breath sounds: No wheezing.   Abdominal:      General: There is no distension.      Palpations: Abdomen is soft.      Tenderness: There is no abdominal tenderness. There is no guarding or rebound.   Musculoskeletal:         General: No deformity.      Cervical back: Normal range of motion and neck supple.   Skin:     General: Skin is warm and dry.   Neurological:      General: No focal deficit present.      Mental Status: He is alert and oriented to person, place, and time.   Psychiatric:         Mood and Affect: Mood normal.         Behavior: Behavior normal.           Pathology, and Other Studies: Results Review Statement: No pertinent imaging studies reviewed.      Assessment/PLAN:    Jamie Mijares is a 34 y.o. male status post lap band at [facility] by Dr. Miller in 2009..      UGI  FINDINGS:     Laparoscopic band noted. Estimated phi angle of 53 degrees, within normal limits.     Contrast passes " freely from the esophagus into the stomach. In spite of multiple attempts, contrast is not noted to pass into the duodenum.     Visualized distal esophagus is unremarkable.     IMPRESSION:     Laparoscopic band noted with estimated phi angle of 53 degrees, within normal limits.     Contrast flows freely from the esophagus into the gastric lumen, but does not pass into the duodenum in spite of multiple attempts.      EGD        Pathology from EGD         MANOMETRY/pH Study        GASTRIC EMPTYING STUDY      --------------------------------------------------------------------  Discussed with the patient that we will continue with the plan for removal of the Lap-Band and possible conversion to another weight loss procedure (sleeve vs bypass) in 2 stages, given that he would like to trial Zepbound after band removal.  He is pending cardiac clearance.  He will be obtaining an echo in the near future.    Plan:  - Cardiac clearance pending- patient to obtain echo  - Patient to reach out after clearance obtained to continue with surgical scheduling              Shane Can MD  Bariatric Surgery  11/16/2024  5:54 AM

## 2024-11-19 ENCOUNTER — HOSPITAL ENCOUNTER (OUTPATIENT)
Dept: NON INVASIVE DIAGNOSTICS | Facility: HOSPITAL | Age: 34
Discharge: HOME | End: 2024-11-19
Payer: COMMERCIAL

## 2024-11-19 VITALS
DIASTOLIC BLOOD PRESSURE: 80 MMHG | WEIGHT: 283 LBS | SYSTOLIC BLOOD PRESSURE: 124 MMHG | HEART RATE: 71 BPM | BODY MASS INDEX: 39.62 KG/M2 | HEIGHT: 71 IN

## 2024-11-19 DIAGNOSIS — Z82.49 FAMILY HISTORY OF CARDIAC ARREST: ICD-10-CM

## 2024-11-19 DIAGNOSIS — Z82.49 FAMILY HISTORY OF HEART DISEASE: ICD-10-CM

## 2024-11-19 LAB
AORTIC ROOT: 3.3 CM
APICAL FOUR CHAMBER EJECTION FRACTION: 56 %
AV LVOT PEAK GRADIENT: 6 MMHG
BSA FOR ECHO PROCEDURE: 2.44 M2
DOP CALC LVOT AREA: 3.8 CM2
DOP CALC LVOT DIAMETER: 2.2 CM
E WAVE DECELERATION TIME: 162 MS
E/A RATIO: 1.62
FRACTIONAL SHORTENING: 31 (ref 28–44)
INTERVENTRICULAR SEPTUM IN DIASTOLE (PARASTERNAL SHORT AXIS VIEW): 1.1 CM
INTERVENTRICULAR SEPTUM: 1.1 CM (ref 0.6–1.1)
LAAS-AP2: 19.9 CM2
LAAS-AP4: 18.4 CM2
LEFT ATRIUM AREA SYSTOLE SINGLE PLANE A4C: 16.7 CM2
LEFT ATRIUM SIZE: 3.9 CM
LEFT ATRIUM VOLUME (MOD BIPLANE): 57 ML
LEFT ATRIUM VOLUME INDEX (MOD BIPLANE): 23.4 ML/M2
LEFT INTERNAL DIMENSION IN SYSTOLE: 3.6 CM (ref 2.1–4)
LEFT VENTRICULAR INTERNAL DIMENSION IN DIASTOLE: 5.2 CM (ref 3.5–6)
LEFT VENTRICULAR POSTERIOR WALL IN END DIASTOLE: 0.9 CM
LEFT VENTRICULAR STROKE VOLUME: 75 ML
LVSV (TEICH): 75 ML
MV E'TISSUE VEL-SEP: 15 CM/S
MV PEAK A VEL: 0.55 M/S
MV PEAK E VEL: 89 CM/S
MV STENOSIS PRESSURE HALF TIME: 47 MS
MV VALVE AREA P 1/2 METHOD: 4.68
RIGHT VENTRICLE ID DIMENSION: 3.4 CM
SL CV LEFT ATRIUM LENGTH A2C: 4.9 CM
SL CV LV EF: 55
SL CV PED ECHO LEFT VENTRICLE DIASTOLIC VOLUME (MOD BIPLANE) 2D: 128 ML
SL CV PED ECHO LEFT VENTRICLE SYSTOLIC VOLUME (MOD BIPLANE) 2D: 53 ML
TRICUSPID ANNULAR PLANE SYSTOLIC EXCURSION: 1.8 CM

## 2024-11-19 PROCEDURE — 93306 TTE W/DOPPLER COMPLETE: CPT | Performed by: INTERNAL MEDICINE

## 2024-11-19 PROCEDURE — 93306 TTE W/DOPPLER COMPLETE: CPT

## 2024-11-22 ENCOUNTER — PREP FOR PROCEDURE (OUTPATIENT)
Dept: BARIATRICS | Facility: CLINIC | Age: 34
End: 2024-11-22

## 2024-11-22 ENCOUNTER — TELEPHONE (OUTPATIENT)
Age: 34
End: 2024-11-22

## 2024-11-22 DIAGNOSIS — R11.10 VOMITING: ICD-10-CM

## 2024-11-22 DIAGNOSIS — R13.10 SWALLOWING DIFFICULTY: ICD-10-CM

## 2024-11-22 DIAGNOSIS — R63.5 WEIGHT GAIN: ICD-10-CM

## 2024-11-22 DIAGNOSIS — K95.09 OTHER COMPLICATIONS OF GASTRIC BAND PROCEDURE: Primary | ICD-10-CM

## 2024-11-22 NOTE — TELEPHONE ENCOUNTER
Pt had an OV with you on 8/19/2024 , also had an EKG done . Would pt need an appt or is CC letter okay ?

## 2024-11-22 NOTE — TELEPHONE ENCOUNTER
Caller: Tana ([facility])    Doctor: Dr Gilbert    Reason for call: Tana from [facility] called and requested a cardiac clearance letter for patient. He is trying to schedule for gastric lap band removal surgery before the end of the year.     Call back#: [phone]

## 2024-12-18 NOTE — PROGRESS NOTES
BARIATRIC HISTORY AND PHYSICAL - BARIATRIC SURGERY  Jamie Mijares 34 y.o. male MRN: [id number]  Unit/Bed#:  Encounter: [id number]      Narrative & Impression   LIMITED UPPER GI SERIES     INDICATION: Z98.84: Bariatric surgery status. Laparoscopic band dislodged     COMPARISON: CT from June 9, 2024 and radiograph from June 9, 2024     TECHNIQUE: A limited single contrast upper GI study was performed with Omnipaque contrast.     IMAGES: 290     FLUOROSCOPY TIME: 2.22  min     FINDINGS:     Laparoscopic band noted. Estimated phi angle of 53 degrees, within normal limits.     Contrast passes freely from the esophagus into the stomach. In spite of multiple attempts, contrast is not noted to pass into the duodenum.     Visualized distal esophagus is unremarkable.     IMPRESSION:     Laparoscopic band noted with estimated phi angle of 53 degrees, within normal limits.     Contrast flows freely from the esophagus into the gastric lumen, but does not pass into the duodenum in spite of multiple attempts.       HPI:  Jamie Mijares is a 34 y.o. male who presents to review their preoperative workup and see if they are a good candidate to undergo a bariatric procedure. Patient is s/p lap band at [facility] by Dr. Miller in 2009.     Review of Systems   Constitutional:  Negative for chills and fever.   HENT:  Negative for ear pain and sore throat.    Eyes:  Negative for pain and visual disturbance.   Respiratory:  Negative for cough and shortness of breath.    Cardiovascular:  Negative for chest pain and palpitations.   Gastrointestinal:  Negative for abdominal pain and vomiting.   Genitourinary:  Negative for dysuria and hematuria.   Musculoskeletal:  Negative for arthralgias and back pain.   Skin:  Negative for color change and rash.   Neurological:  Negative for seizures and syncope.   All other systems reviewed and are negative.      Historical Information   Past Medical History:   Diagnosis Date    Clotting disorder  "(HCC)     Sleep apnea      Past Surgical History:   Procedure Laterality Date    HERNIA REPAIR      LAPAROSCOPIC GASTRIC BANDING      approx 09-10    NASAL FRACTURE SURGERY  2011    WRIST SURGERY       Social History   Social History     Substance and Sexual Activity   Alcohol Use Yes    Comment: social     Social History     Substance and Sexual Activity   Drug Use Never     Social History     Tobacco Use   Smoking Status Former    Current packs/day: 0.00    Average packs/day: 0.3 packs/day for 1 year (0.3 ttl pk-yrs)    Types: Cigarettes    Start date:     Quit date:     Years since quittin.9   Smokeless Tobacco Never     Family History:   Family History   Problem Relation Age of Onset    TAYLER disease Mother     Hypertension Father     Hyperlipidemia Father         cardiac arrest    Heart attack Father     Lung cancer Maternal Uncle     Hepatitis Maternal Uncle     Colon cancer Neg Hx     Prostate cancer Neg Hx     Testicular cancer Neg Hx        Meds/Allergies   PTA meds:   Cannot display prior to admission medications because the patient has not been admitted in this contact.     No Known Allergies    Objective     Current Vitals:   Blood Pressure: 120/78 (24 1311)  Pulse: 74 (24 1311)  Temperature: 98.2 °F (36.8 °C) (24 1311)  Temp Source: Tympanic (24 1311)  Height: 5' 10.5\" (179.1 cm) (24 1311)  Weight - Scale: 136 kg (299 lb 8 oz) (24 1311)  SpO2: 98 % (24 1311)  bowel movement  [unfilled]    Invasive Devices       None                   Physical Exam  Constitutional:       General: He is not in acute distress.     Appearance: He is obese. He is not ill-appearing or toxic-appearing.   HENT:      Head: Normocephalic and atraumatic.      Right Ear: External ear normal.      Left Ear: External ear normal.      Nose: Nose normal.      Mouth/Throat:      Mouth: Mucous membranes are moist.      Pharynx: Oropharynx is clear.   Eyes:      Extraocular Movements: " Extraocular movements intact.      Conjunctiva/sclera: Conjunctivae normal.   Cardiovascular:      Rate and Rhythm: Normal rate.   Pulmonary:      Effort: Pulmonary effort is normal. No respiratory distress.      Breath sounds: No wheezing.   Abdominal:      General: There is no distension.      Palpations: Abdomen is soft.      Tenderness: There is no abdominal tenderness. There is no guarding or rebound.   Musculoskeletal:         General: No deformity.      Cervical back: Normal range of motion and neck supple.   Skin:     General: Skin is warm and dry.   Neurological:      General: No focal deficit present.      Mental Status: He is alert and oriented to person, place, and time.   Psychiatric:         Mood and Affect: Mood normal.         Behavior: Behavior normal.         Lab Results: I have personally reviewed pertinent lab results.    Imaging: Results Review Statement: No pertinent imaging studies reviewed.  EKG, Pathology, and Other Studies: Results Review Statement: No pertinent imaging studies reviewed.    Code Status: [unfilled]  Advance Directive and Living Will:      Power of :    POLST:        Assessment/Plan:    The patient presented to review the preoperative workup and see if bariatric surgery is appropriate and indicated following the extensive preoperative workup and the enrollment in our weight loss program.  Preoperative workup was complete. Results were reviewed with the patient including the blood work results and the endoscopy findings and the biopsy results. We also reviewed the cardiology evaluation.    The patient was determined to be a good candidate for Lap band removal.  ----------------------------------------------------------------------  Smoking: no  Blood thinner use: no  Home pain medication use and who manages it: no  CPAP use: no   History of blood clots: no  Previous abdominal surgeries: lap band, bilateral inguinal hernia  Cardiac clearance obtained: yes   EKG  performed: yes  EGD prior to surgery: n/a  Screening Labs obtained (CBC, CMP): yes  H. Pylori status: n/a  Medication allergies: NKDA  SLIM consult Post-Op: no  Reminded patient about 2 week pre-op liquid diet: n/a  10% body weight loss pre-operatively met/discussed: n/a  Consent signed: yes  Pre-Op ERAS Orders placed: yes  -----------------------------------------------------------------------  Risks and benefits explained one more time to the patient. Alternatives to surgery and alternative forms of surgery were also explained. Post-surgical commitment and after care programs were explained.  Consent was signed. Questions were answered and concerns were addressed. Patient will need to start the 2 week liquid diet prior to surgery.  Patient wishes to proceed. As per [facility] guidelines, I had a discussion with the patient regarding their CODE STATUS in the perioperative period and the patient is level 1 or FULL CODE STATUS.    Shane Can MD  Bariatric Surgery   12/19/2024  1:29 PM

## 2024-12-19 ENCOUNTER — TELEPHONE (OUTPATIENT)
Age: 34
End: 2024-12-19

## 2024-12-19 ENCOUNTER — OFFICE VISIT (OUTPATIENT)
Dept: BARIATRICS | Facility: CLINIC | Age: 34
End: 2024-12-19
Payer: COMMERCIAL

## 2024-12-19 VITALS
TEMPERATURE: 98.2 F | BODY MASS INDEX: 41.93 KG/M2 | HEIGHT: 71 IN | OXYGEN SATURATION: 98 % | HEART RATE: 74 BPM | DIASTOLIC BLOOD PRESSURE: 78 MMHG | WEIGHT: 299.5 LBS | SYSTOLIC BLOOD PRESSURE: 120 MMHG

## 2024-12-19 DIAGNOSIS — Z98.84 H/O LAPAROSCOPIC ADJUSTABLE GASTRIC BANDING: Primary | ICD-10-CM

## 2024-12-19 PROCEDURE — 99213 OFFICE O/P EST LOW 20 MIN: CPT | Performed by: STUDENT IN AN ORGANIZED HEALTH CARE EDUCATION/TRAINING PROGRAM

## 2024-12-19 RX ORDER — ACETAMINOPHEN 10 MG/ML
1000 INJECTION, SOLUTION INTRAVENOUS ONCE
OUTPATIENT
Start: 2024-12-19 | End: 2024-12-19

## 2024-12-19 RX ORDER — HEPARIN SODIUM 5000 [USP'U]/ML
7500 INJECTION, SOLUTION INTRAVENOUS; SUBCUTANEOUS ONCE
OUTPATIENT
Start: 2024-12-19 | End: 2024-12-19

## 2024-12-19 RX ORDER — CELECOXIB 200 MG/1
200 CAPSULE ORAL ONCE
OUTPATIENT
Start: 2024-12-19 | End: 2024-12-19

## 2024-12-19 RX ORDER — TIRZEPATIDE 2.5 MG/.5ML
2.5 INJECTION, SOLUTION SUBCUTANEOUS WEEKLY
Qty: 2 ML | Refills: 0 | Status: SHIPPED | OUTPATIENT
Start: 2024-12-19 | End: 2025-01-16

## 2024-12-20 DIAGNOSIS — Z98.84 H/O LAPAROSCOPIC ADJUSTABLE GASTRIC BANDING: Primary | ICD-10-CM

## 2024-12-20 NOTE — TELEPHONE ENCOUNTER
PO Meds for Laparoscopic  Removal Gastric  Band  W/ Robotics & Intraoperative . With Dr. Gen Varghese on 12/31

## 2024-12-30 ENCOUNTER — ANESTHESIA EVENT (OUTPATIENT)
Dept: PERIOP | Facility: HOSPITAL | Age: 34
End: 2024-12-30
Payer: COMMERCIAL

## 2024-12-31 ENCOUNTER — ANESTHESIA (OUTPATIENT)
Dept: PERIOP | Facility: HOSPITAL | Age: 34
End: 2024-12-31
Payer: COMMERCIAL

## 2024-12-31 ENCOUNTER — HOSPITAL ENCOUNTER (OUTPATIENT)
Facility: HOSPITAL | Age: 34
Setting detail: OUTPATIENT SURGERY
Discharge: HOME | End: 2024-12-31
Attending: SURGERY | Admitting: SURGERY
Payer: COMMERCIAL

## 2024-12-31 VITALS
RESPIRATION RATE: 18 BRPM | HEART RATE: 62 BPM | BODY MASS INDEX: 41.85 KG/M2 | DIASTOLIC BLOOD PRESSURE: 57 MMHG | WEIGHT: 295.86 LBS | TEMPERATURE: 97.2 F | OXYGEN SATURATION: 94 % | SYSTOLIC BLOOD PRESSURE: 106 MMHG

## 2024-12-31 DIAGNOSIS — K95.09 OTHER COMPLICATIONS OF GASTRIC BAND PROCEDURE: ICD-10-CM

## 2024-12-31 DIAGNOSIS — R63.5 WEIGHT GAIN: ICD-10-CM

## 2024-12-31 DIAGNOSIS — R13.10 SWALLOWING DIFFICULTY: ICD-10-CM

## 2024-12-31 DIAGNOSIS — E66.01 OBESITY, CLASS III, BMI 40-49.9 (MORBID OBESITY) (HCC): Primary | ICD-10-CM

## 2024-12-31 DIAGNOSIS — Z98.84 BARIATRIC SURGERY STATUS: ICD-10-CM

## 2024-12-31 DIAGNOSIS — R11.10 VOMITING: ICD-10-CM

## 2024-12-31 LAB
APTT PPP: 25 SECONDS (ref 23–34)
INR PPP: 1.06 (ref 0.85–1.19)
PROTHROMBIN TIME: 14 SECONDS (ref 12.3–15)

## 2024-12-31 PROCEDURE — 99024 POSTOP FOLLOW-UP VISIT: CPT | Performed by: SURGERY

## 2024-12-31 PROCEDURE — C9290 INJ, BUPIVACAINE LIPOSOME: HCPCS | Performed by: STUDENT IN AN ORGANIZED HEALTH CARE EDUCATION/TRAINING PROGRAM

## 2024-12-31 PROCEDURE — 85730 THROMBOPLASTIN TIME PARTIAL: CPT | Performed by: PHYSICIAN ASSISTANT

## 2024-12-31 PROCEDURE — 88300 SURGICAL PATH GROSS: CPT | Performed by: PATHOLOGY

## 2024-12-31 PROCEDURE — S2900 ROBOTIC SURGICAL SYSTEM: HCPCS | Performed by: STUDENT IN AN ORGANIZED HEALTH CARE EDUCATION/TRAINING PROGRAM

## 2024-12-31 PROCEDURE — 43774 LAP RMVL GASTR ADJ ALL PARTS: CPT | Performed by: SURGERY

## 2024-12-31 PROCEDURE — 85610 PROTHROMBIN TIME: CPT | Performed by: PHYSICIAN ASSISTANT

## 2024-12-31 PROCEDURE — 43774 LAP RMVL GASTR ADJ ALL PARTS: CPT | Performed by: STUDENT IN AN ORGANIZED HEALTH CARE EDUCATION/TRAINING PROGRAM

## 2024-12-31 RX ORDER — GLYCOPYRROLATE 0.2 MG/ML
INJECTION INTRAMUSCULAR; INTRAVENOUS AS NEEDED
Status: DISCONTINUED | OUTPATIENT
Start: 2024-12-31 | End: 2024-12-31

## 2024-12-31 RX ORDER — ACETAMINOPHEN 10 MG/ML
1000 INJECTION, SOLUTION INTRAVENOUS ONCE
Status: COMPLETED | OUTPATIENT
Start: 2024-12-31 | End: 2024-12-31

## 2024-12-31 RX ORDER — CELECOXIB 200 MG/1
200 CAPSULE ORAL ONCE
Status: COMPLETED | OUTPATIENT
Start: 2024-12-31 | End: 2024-12-31

## 2024-12-31 RX ORDER — ROCURONIUM BROMIDE 10 MG/ML
INJECTION, SOLUTION INTRAVENOUS AS NEEDED
Status: DISCONTINUED | OUTPATIENT
Start: 2024-12-31 | End: 2024-12-31

## 2024-12-31 RX ORDER — PROPOFOL 10 MG/ML
INJECTION, EMULSION INTRAVENOUS CONTINUOUS PRN
Status: DISCONTINUED | OUTPATIENT
Start: 2024-12-31 | End: 2024-12-31

## 2024-12-31 RX ORDER — OXYCODONE HYDROCHLORIDE 5 MG/1
5 TABLET ORAL EVERY 4 HOURS PRN
Qty: 10 TABLET | Refills: 0 | Status: SHIPPED | OUTPATIENT
Start: 2024-12-31

## 2024-12-31 RX ORDER — FENTANYL CITRATE/PF 50 MCG/ML
25 SYRINGE (ML) INJECTION
Status: DISCONTINUED | OUTPATIENT
Start: 2024-12-31 | End: 2024-12-31 | Stop reason: HOSPADM

## 2024-12-31 RX ORDER — ALBUTEROL SULFATE 0.83 MG/ML
2.5 SOLUTION RESPIRATORY (INHALATION) ONCE AS NEEDED
Status: DISCONTINUED | OUTPATIENT
Start: 2024-12-31 | End: 2024-12-31 | Stop reason: HOSPADM

## 2024-12-31 RX ORDER — MIDAZOLAM HYDROCHLORIDE 2 MG/2ML
INJECTION, SOLUTION INTRAMUSCULAR; INTRAVENOUS AS NEEDED
Status: DISCONTINUED | OUTPATIENT
Start: 2024-12-31 | End: 2024-12-31

## 2024-12-31 RX ORDER — ONDANSETRON 2 MG/ML
4 INJECTION INTRAMUSCULAR; INTRAVENOUS ONCE AS NEEDED
Status: DISCONTINUED | OUTPATIENT
Start: 2024-12-31 | End: 2024-12-31 | Stop reason: HOSPADM

## 2024-12-31 RX ORDER — PROMETHAZINE HYDROCHLORIDE 25 MG/ML
12.5 INJECTION, SOLUTION INTRAMUSCULAR; INTRAVENOUS ONCE AS NEEDED
Status: DISCONTINUED | OUTPATIENT
Start: 2024-12-31 | End: 2024-12-31 | Stop reason: HOSPADM

## 2024-12-31 RX ORDER — ONDANSETRON 2 MG/ML
INJECTION INTRAMUSCULAR; INTRAVENOUS AS NEEDED
Status: DISCONTINUED | OUTPATIENT
Start: 2024-12-31 | End: 2024-12-31

## 2024-12-31 RX ORDER — SODIUM CHLORIDE, SODIUM LACTATE, POTASSIUM CHLORIDE, CALCIUM CHLORIDE 600; 310; 30; 20 MG/100ML; MG/100ML; MG/100ML; MG/100ML
125 INJECTION, SOLUTION INTRAVENOUS CONTINUOUS
Status: DISCONTINUED | OUTPATIENT
Start: 2024-12-31 | End: 2024-12-31 | Stop reason: HOSPADM

## 2024-12-31 RX ORDER — SUCCINYLCHOLINE/SOD CL,ISO/PF 100 MG/5ML
SYRINGE (ML) INTRAVENOUS AS NEEDED
Status: DISCONTINUED | OUTPATIENT
Start: 2024-12-31 | End: 2024-12-31

## 2024-12-31 RX ORDER — HEPARIN SODIUM 5000 [USP'U]/ML
7500 INJECTION, SOLUTION INTRAVENOUS; SUBCUTANEOUS ONCE
Status: COMPLETED | OUTPATIENT
Start: 2024-12-31 | End: 2024-12-31

## 2024-12-31 RX ORDER — BUPIVACAINE HYDROCHLORIDE 5 MG/ML
INJECTION, SOLUTION EPIDURAL; INTRACAUDAL AS NEEDED
Status: DISCONTINUED | OUTPATIENT
Start: 2024-12-31 | End: 2024-12-31 | Stop reason: HOSPADM

## 2024-12-31 RX ORDER — ACETAMINOPHEN 325 MG/1
975 TABLET ORAL ONCE
Status: DISCONTINUED | OUTPATIENT
Start: 2024-12-31 | End: 2024-12-31 | Stop reason: HOSPADM

## 2024-12-31 RX ORDER — PROPOFOL 10 MG/ML
INJECTION, EMULSION INTRAVENOUS AS NEEDED
Status: DISCONTINUED | OUTPATIENT
Start: 2024-12-31 | End: 2024-12-31

## 2024-12-31 RX ORDER — LIDOCAINE HYDROCHLORIDE 10 MG/ML
INJECTION, SOLUTION EPIDURAL; INFILTRATION; INTRACAUDAL; PERINEURAL AS NEEDED
Status: DISCONTINUED | OUTPATIENT
Start: 2024-12-31 | End: 2024-12-31

## 2024-12-31 RX ORDER — HYDROMORPHONE HCL/PF 1 MG/ML
0.5 SYRINGE (ML) INJECTION
Status: DISCONTINUED | OUTPATIENT
Start: 2024-12-31 | End: 2024-12-31 | Stop reason: HOSPADM

## 2024-12-31 RX ORDER — MAGNESIUM HYDROXIDE 1200 MG/15ML
LIQUID ORAL AS NEEDED
Status: DISCONTINUED | OUTPATIENT
Start: 2024-12-31 | End: 2024-12-31 | Stop reason: HOSPADM

## 2024-12-31 RX ORDER — DEXAMETHASONE SODIUM PHOSPHATE 10 MG/ML
INJECTION, SOLUTION INTRAMUSCULAR; INTRAVENOUS AS NEEDED
Status: DISCONTINUED | OUTPATIENT
Start: 2024-12-31 | End: 2024-12-31

## 2024-12-31 RX ADMIN — LIDOCAINE HYDROCHLORIDE 100 MG: 10 INJECTION, SOLUTION EPIDURAL; INFILTRATION; INTRACAUDAL; PERINEURAL at 12:37

## 2024-12-31 RX ADMIN — PROPOFOL 20 MCG/KG/MIN: 10 INJECTION, EMULSION INTRAVENOUS at 12:41

## 2024-12-31 RX ADMIN — CELECOXIB 200 MG: 200 CAPSULE ORAL at 11:00

## 2024-12-31 RX ADMIN — SODIUM CHLORIDE 4 MCG: 9 INJECTION, SOLUTION INTRAVENOUS at 13:42

## 2024-12-31 RX ADMIN — Medication 3000 MG: at 12:45

## 2024-12-31 RX ADMIN — SODIUM CHLORIDE, SODIUM LACTATE, POTASSIUM CHLORIDE, AND CALCIUM CHLORIDE: .6; .31; .03; .02 INJECTION, SOLUTION INTRAVENOUS at 12:30

## 2024-12-31 RX ADMIN — ROCURONIUM 50 MG: 50 INJECTION, SOLUTION INTRAVENOUS at 12:37

## 2024-12-31 RX ADMIN — PROPOFOL 300 MG: 10 INJECTION, EMULSION INTRAVENOUS at 12:37

## 2024-12-31 RX ADMIN — DEXAMETHASONE SODIUM PHOSPHATE 10 MG: 10 INJECTION INTRAMUSCULAR; INTRAVENOUS at 12:37

## 2024-12-31 RX ADMIN — SODIUM CHLORIDE 4 MCG: 9 INJECTION, SOLUTION INTRAVENOUS at 13:03

## 2024-12-31 RX ADMIN — SODIUM CHLORIDE 4 MCG: 9 INJECTION, SOLUTION INTRAVENOUS at 13:15

## 2024-12-31 RX ADMIN — SODIUM CHLORIDE, SODIUM LACTATE, POTASSIUM CHLORIDE, AND CALCIUM CHLORIDE 125 ML/HR: .6; .31; .03; .02 INJECTION, SOLUTION INTRAVENOUS at 11:50

## 2024-12-31 RX ADMIN — MIDAZOLAM 2 MG: 1 INJECTION INTRAMUSCULAR; INTRAVENOUS at 12:24

## 2024-12-31 RX ADMIN — ONDANSETRON 4 MG: 2 INJECTION INTRAMUSCULAR; INTRAVENOUS at 13:41

## 2024-12-31 RX ADMIN — GLYCOPYRROLATE 0.2 MCG: 0.2 INJECTION, SOLUTION INTRAMUSCULAR; INTRAVENOUS at 12:37

## 2024-12-31 RX ADMIN — ACETAMINOPHEN 1000 MG: 10 INJECTION INTRAVENOUS at 12:45

## 2024-12-31 RX ADMIN — SODIUM CHLORIDE 4 MCG: 9 INJECTION, SOLUTION INTRAVENOUS at 13:10

## 2024-12-31 RX ADMIN — FOSAPREPITANT DIMEGLUMINE 150 MG: 150 INJECTION, POWDER, LYOPHILIZED, FOR SOLUTION INTRAVENOUS at 11:15

## 2024-12-31 RX ADMIN — Medication 200 MG: at 12:37

## 2024-12-31 RX ADMIN — ROCURONIUM 20 MG: 50 INJECTION, SOLUTION INTRAVENOUS at 13:06

## 2024-12-31 RX ADMIN — HEPARIN SODIUM 7500 UNITS: 5000 INJECTION INTRAVENOUS; SUBCUTANEOUS at 12:46

## 2024-12-31 RX ADMIN — SUGAMMADEX 300 MG: 100 INJECTION, SOLUTION INTRAVENOUS at 13:43

## 2024-12-31 NOTE — ANESTHESIA PREPROCEDURE EVALUATION
Procedure:  REMOVAL GASTRIC BAND W/ ROBOTICS & INTRAOP EGD (Abdomen)    Relevant Problems   PULMONARY   (+) JANET (obstructive sleep apnea)        Physical Exam    Airway    Mallampati score: II  TM Distance: >3 FB  Neck ROM: full     Dental   No notable dental hx     Cardiovascular  Cardiovascular exam normal    Pulmonary  Pulmonary exam normal     Other Findings        Anesthesia Plan  ASA Score- 3     Anesthesia Type- general with ASA Monitors.         Additional Monitors:     Airway Plan: ETT.           Plan Factors-Exercise tolerance (METS): >4 METS.    Chart reviewed. EKG reviewed.  Existing labs reviewed. Patient summary reviewed.    Patient is not a current smoker.              Induction-     Postoperative Plan-     Perioperative Resuscitation Plan - Level 1 - Full Code.       Informed Consent- Anesthetic plan and risks discussed with patient.  I personally reviewed this patient with the CRNA. Discussed and agreed on the Anesthesia Plan with the CRNA..    Pt endorsed remote hx of hematologic workup for possible hypercoagulable disorder >15 years ago. States he was told at that time that as he got older he would need to stay active but it was nothing that required further treatment. Has had multiple prior surgeries (abdominal and facial) with no abnormal bleeding or clotting. Heals normally from injuries. After discussion with pt he agrees risks are low of any abnormalities with this procedure and desires to proceed.

## 2024-12-31 NOTE — OP NOTE
OPERATIVE REPORT  PATIENT NAME: Jamie Mijares    :  1990  MRN: [id number]  Pt Location: [facility] OR ROOM 08    SURGERY DATE: 2024    Surgeons and Role:     * Michele Varghese MD - Primary     * Shun Lopez MD - Assisting     * Rosario Tran PA-C - Assisting    Preop Diagnosis:  Other complications of gastric band procedure [K95.09]  Vomiting [R11.10]  Swallowing difficulty [R13.10]  Weight gain [R63.5]    Post-Op Diagnosis Codes:     * Other complications of gastric band procedure [K95.09]     * Vomiting [R11.10]     * Swallowing difficulty [R13.10]     * Weight gain [R63.5]    Procedure(s):  REMOVAL GASTRIC BAND W/ ROBOTICS  Port explantation    Specimen(s):  ID Type Source Tests Collected by Time Destination   1 : Gastric band for explantation Other Other TISSUE EXAM Michele Varghese MD 2024  1:48 PM        Estimated Blood Loss:   Minimal    Drains:  * No LDAs found *    Anesthesia Type:   General    Operative Indications:  Other complications of gastric band procedure [K95.09]  Vomiting [R11.10]  Swallowing difficulty [R13.10]  Weight gain [R63.5]    Operative Findings:  Port explantation, located right upper quadrant  REALIZE™ Adjustable Gastric Band (REALIZE Band)    Complications:   None    Procedure and Technique:  The patient was brought to the operating room and placed in a supine position. The patient received a dose of IV antibiotics and a dose of subcutaneous Heparin prior to the procedure. The patient was induced under general endotracheal anesthesia. The abdominal wall was prepped and draped under sterile conditions in the usual fashion. The procedure was started by obtaining access to the abdominal cavity using a Veress needle to the left side of the midline around 6 inches from the xiphoid the abdominal cavity was insufflated with CO2 to a pressure of 15 mmHg. After that, the abdomen was entered with an 8 mm trocar using an Optiview trocar under direct visualization. At that point,  two 8 mm robotic trocars were placed on the right side of the abdominal wall, under direct visualization, and another 8 mm robotic trocar and 12 mm assistant trocar were placed on the left side of the abdominal wall, also under direct visualization. A TAP block was performed using 20 ml of Exparel mixed with saline and 0.5 % Marcaine for a total of 100 ml.     The patient was then placed in a reverse Trendelenburg position. A Mckenna retractor was placed through a small stab incision below the xiphoid and was used to retract the left lobe of the liver in a medial fashion, the robot was then docked and locked in place.    We started by turning our attention to the tubing connecting the port to the gastric band.  This was cut using sharp scissors nearly flush to the peritoneum.  Using hook cautery we dissected the capsule around the gastric band.  We then used scissors to cut the gastric band.  The band was able to be easily pulled off the stomach.    The robot was then undocked and the band and tubing was removed from the 12 mm assist port. The 12 mm port was closed  with 1-0 Vicryl in a simple fashion.     We then turned our attention to removing the port.  A transverse incision was made over the port site.  We dissected down to the port using electrocautery.  The port was dissected off the abdominal wall and surrounding capsule using electrocautery.    We made note to remove the port and the entirety of the tubing.  This also included the small plastic connector piece.  We then turned our attention to removing the port capsule using electrocautery.    Both the port site and the extraction site were irrigated with a combined total of 3 L of sterile saline.    The port site was closed by reapproximating both Dc's and deep dermal layers using interrupted, undyed 3-0 Vicryl.    All the skin edges of the trocar sites were approximated with 4-0 Monocryl in a subcuticular inverted fashion. The patient was extubated  and transferred to the PACU in stable condition.     The band and port was sent to pathology as gross specimen.  Dr. Gen Varghese was present for the entire procedure.    Patient Disposition:  PACU     SIGNATURE: Shun Lopez MD  DATE: December 31, 2024  TIME: 2:00 PM

## 2024-12-31 NOTE — ANESTHESIA POSTPROCEDURE EVALUATION
Post-Op Assessment Note    CV Status:  Stable  Pain Score: 0    Pain management: adequate       Mental Status:  Alert and awake   Hydration Status:  Euvolemic   PONV Controlled:  Controlled   Airway Patency:  Patent     Post Op Vitals Reviewed: Yes    No anethesia notable event occurred.    Staff: CRNA           Last Filed PACU Vitals:  Vitals Value Taken Time   Temp     Pulse 72 12/31/24 1400   /55 12/31/24 1359   Resp 17 12/31/24 1400   SpO2 96 % 12/31/24 1400   Vitals shown include unfiled device data.    Modified Yobany:  No data recorded

## 2024-12-31 NOTE — DISCHARGE INSTRUCTIONS
your medications from [facility] in Hospital Lobby   Take Tylenol every 8 hours around the clock, unless instructed otherwise  Take your omeprazole daily  It is important to stay hydrated and follow your discharge diet progression   Mild nausea is ok as long as you can drink fluids, sip very slowly and get up and walk during any periods of nausea  You may shower normally after 48 hours, but do not scrub incision sites, blot gently with clean towel to dry incisions  Take home medications as usual unless instructed otherwise while in hospital  Follow up with Dr. Gen Varghese and your PCP within the next week  Sleeve gastrectomy patients ONLY: Complete full course of lovenox injections!

## 2024-12-31 NOTE — DISCHARGE INSTR - AVS FIRST PAGE
Bariatric/Weight Loss Surgery  Hospital Discharge Instructions  ACTIVITY:  Progress as feels comfortable - a good rule is:  if you are doing something and it begins to hurt, stop doing the activity. Walk every hour while at home.  You may walk stairs if you do so slowly  You may shower 48 hours after surgery.  Do not scrub incision sites.  Blot gently with clean towel to dry incisions. (see #4 below)   Use your incentive spirometer 10 times per hour while awake for 1 week after surgery.  Do NOT drive for 48 hours after surgery. No driving 24 hours after taking certain prescription pain medications. Examples of such medication are Percocet, Darvocet, Oxycodone, Tylenol #3, and Tylenol with Codeine.     DIET  You may advance your diet as instructed.    MEDICATIONS:  The abdominal nerve block will wear off during the first 1-2 days that you are home, and you may become sore (especially over incision site/sites where abdominal wall is sutured). This may create a pulling sensation, especially while moving around, and will fade over time.  Continue to take your Tylenol and your pain medication as instructed.   Start vitamins and minerals one week after surgery or when you start stage 3/puree diet.   Anti-acid Medication as per prescription.  Other medications as indicated on the Physician Patient Discharge Instructions form given to you at the time of discharge.    INCISION CARE  You may shower and get incisions wet 2 days after surgery. No soaking tub baths or swimming for 30 days after surgery. Keep abdominal area and incisions clean. Use soap and water to create a good lather and rinse off.  Do not scrub incisions.   If you have a drain, empty the drain as the nurses instructed.    FOLLOW-UP APPOINTMENT should be made for one week after discharge. Call surgeon’s office at [phone] to schedule an appointment.    CALL YOUR DOCTOR FOR:  pain not controlled by pain medications, a temperature greater than 101.5° F, any  increase or change in drainage or redness from any incision, any vomiting or inability to keep liquids down, shortness of breath, shoulder pain, or bleeding

## 2025-01-01 NOTE — ANESTHESIA POSTPROCEDURE EVALUATION
Post-Op Assessment Note    CV Status:  Stable  Pain Score: 0    Pain management: adequate    Multimodal analgesia used between 6 hours prior to anesthesia start to PACU discharge    Mental Status:  Alert and awake   Hydration Status:  Euvolemic   PONV Controlled:  Controlled   Airway Patency:  Patent     Post Op Vitals Reviewed: Yes    No anethesia notable event occurred.    Staff: CRNA, Anesthesiologist           Last Filed PACU Vitals:  Vitals Value Taken Time   Temp     Pulse 72 12/31/24 1400   /55 12/31/24 1359   Resp 17 12/31/24 1400   SpO2 96 % 12/31/24 1400   Vitals shown include unfiled device data.    Modified Yobany:  Activity: 2 (12/31/2024  2:45 PM)  Respiration: 2 (12/31/2024  2:45 PM)  Circulation: 2 (12/31/2024  2:45 PM)  Consciousness: 2 (12/31/2024  2:45 PM)  Oxygen Saturation: 2 (12/31/2024  2:45 PM)  Modified Yobany Score: 10 (12/31/2024  2:45 PM)

## 2025-01-03 ENCOUNTER — TELEPHONE (OUTPATIENT)
Dept: BARIATRICS | Facility: CLINIC | Age: 35
End: 2025-01-03

## 2025-01-03 NOTE — TELEPHONE ENCOUNTER
LVM explaining to Pt Dr Lopez will speak with him re: Zepbound at his 1st post-op appointment next week. Requested Pt reach out with any questions or concerns prior to his appointment.

## 2025-01-06 PROCEDURE — 88300 SURGICAL PATH GROSS: CPT | Performed by: PATHOLOGY

## 2025-01-10 ENCOUNTER — OFFICE VISIT (OUTPATIENT)
Dept: BARIATRICS | Facility: CLINIC | Age: 35
End: 2025-01-10

## 2025-01-10 VITALS
WEIGHT: 290 LBS | HEART RATE: 58 BPM | BODY MASS INDEX: 40.6 KG/M2 | HEIGHT: 71 IN | TEMPERATURE: 97.5 F | SYSTOLIC BLOOD PRESSURE: 120 MMHG | DIASTOLIC BLOOD PRESSURE: 80 MMHG

## 2025-01-10 DIAGNOSIS — Z48.89 POSTOPERATIVE VISIT: Primary | ICD-10-CM

## 2025-01-10 NOTE — PROGRESS NOTES
POST OP UP VISIT - BARIATRIC SURGERY  Jamie Mijares 35 y.o. male MRN: [id number]  Unit/Bed#:  Encounter: [id number]      HPI:  Jamie Mijares is a 35 y.o. male status post robotic port removal performed by Dr. Gen Varghese on 24 returning to office for first post op visit since surgery.    Patient's sx have resolved.  Tolerating diet. Denies nausea and vomiting.     Review of Systems   Constitutional:  Negative for chills and fever.   HENT:  Negative for ear pain and sore throat.    Eyes:  Negative for pain and visual disturbance.   Respiratory:  Negative for cough and shortness of breath.    Cardiovascular:  Negative for chest pain and palpitations.   Gastrointestinal:  Negative for abdominal pain and vomiting.   Genitourinary:  Negative for dysuria and hematuria.   Musculoskeletal:  Negative for arthralgias and back pain.   Skin:  Negative for color change and rash.   Neurological:  Negative for seizures and syncope.   All other systems reviewed and are negative.      Historical Information   Past Medical History:   Diagnosis Date    Clotting disorder (HCC)     Sleep apnea      Past Surgical History:   Procedure Laterality Date    HERNIA REPAIR      LAPAROSCOPIC GASTRIC BANDING      approx 09-10    NASAL FRACTURE SURGERY      REMOVAL GASTRIC BAND LAPAROSCOPIC N/A 2024    Procedure: REMOVAL GASTRIC BAND W/ ROBOTICS;  Surgeon: Michele Varghese MD;  Location: [facility];  Service: Bariatrics    WRIST SURGERY       Social History   Social History     Substance and Sexual Activity   Alcohol Use Yes    Comment: social     Social History     Substance and Sexual Activity   Drug Use Never     Social History     Tobacco Use   Smoking Status Former    Current packs/day: 0.00    Average packs/day: 0.3 packs/day for 1 year (0.3 ttl pk-yrs)    Types: Cigarettes    Start date:     Quit date: 2013    Years since quittin.0   Smokeless Tobacco Never     Family History: Family history  non-contributory    Meds/Allergies   all medications and allergies reviewed  Allergies   Allergen Reactions    Aspirin Other (See Comments)     Told by hematologist, due to clotting disorder, do not take       Objective       Current Vitals:          Invasive Devices       None                   Physical Exam  Constitutional:       Appearance: Normal appearance.   HENT:      Head: Normocephalic.      Nose: Nose normal.   Eyes:      Extraocular Movements: Extraocular movements intact.   Cardiovascular:      Rate and Rhythm: Normal rate.   Pulmonary:      Effort: Pulmonary effort is normal.   Abdominal:      General: Abdomen is flat.      Palpations: Abdomen is soft.      Comments: Skin reaction to the skin glue   Musculoskeletal:         General: Normal range of motion.   Skin:     General: Skin is warm.   Psychiatric:         Mood and Affect: Mood normal.         Behavior: Behavior normal.     Assessment/PLAN:    35 y.o. male status post post robotic port removal performed by Dr. Gen Varghese on 12/31/24, doing well post op. No major issues and healing well.     Patient interested in weight loss options. Would like to see our MWM. Patient will start on the medical weight loss for possible sleeve.        Shun Lopez MD  Bariatric Surgery   1/10/2025  3:07 PM      .

## 2025-01-10 NOTE — PROGRESS NOTES
Date of surgery:12/31/2024  Procedure: Band Removal   Performing surgeon: Dr Gen Varghese    Initial Weight - 299  Current Weight -290  Mateo Weight - now  Total Body Weight Loss (EWL)-  EWL% -   TWB % -

## 2025-01-29 ENCOUNTER — OFFICE VISIT (OUTPATIENT)
Dept: BARIATRICS | Facility: CLINIC | Age: 35
End: 2025-01-29
Payer: COMMERCIAL

## 2025-01-29 ENCOUNTER — TELEPHONE (OUTPATIENT)
Age: 35
End: 2025-01-29

## 2025-01-29 VITALS
DIASTOLIC BLOOD PRESSURE: 86 MMHG | WEIGHT: 291.8 LBS | TEMPERATURE: 97.2 F | HEIGHT: 70 IN | RESPIRATION RATE: 16 BRPM | HEART RATE: 101 BPM | SYSTOLIC BLOOD PRESSURE: 138 MMHG | BODY MASS INDEX: 41.78 KG/M2

## 2025-01-29 DIAGNOSIS — E66.01 SEVERE OBESITY (BMI >= 40) (HCC): Primary | ICD-10-CM

## 2025-01-29 DIAGNOSIS — G47.33 OSA (OBSTRUCTIVE SLEEP APNEA): ICD-10-CM

## 2025-01-29 PROCEDURE — 99214 OFFICE O/P EST MOD 30 MIN: CPT | Performed by: PHYSICIAN ASSISTANT

## 2025-01-29 RX ORDER — TIRZEPATIDE 2.5 MG/.5ML
2.5 INJECTION, SOLUTION SUBCUTANEOUS WEEKLY
Qty: 2 ML | Refills: 0 | Status: SHIPPED | OUTPATIENT
Start: 2025-01-29 | End: 2025-02-07

## 2025-01-29 NOTE — Clinical Note
He needs a zepbound PA.  Surgery ordered it but I dont think they did the PA.  Now not seeing surgery

## 2025-01-29 NOTE — ASSESSMENT & PLAN NOTE
Has moderated JANET with 79 respiratory events made up of 51 obstructive apneas, 0 central apneas, 0 mixed apneas and 28 hypopneas resulting in a respiratory event index (IDA) of 16.3.  The lowest SpO2 recorded is 85% and 2 minutes of the study was spent with saturations below 90%.  The snore index was 12.4%.   Has not tolerated CPAP or other mouth pieces. Maybe interested in inspire but needs to lose weight.  To start on zepbound

## 2025-01-29 NOTE — PROGRESS NOTES
Assessment/Plan:    JANET (obstructive sleep apnea)  Has moderated JANET with 79 respiratory events made up of 51 obstructive apneas, 0 central apneas, 0 mixed apneas and 28 hypopneas resulting in a respiratory event index (IDA) of 16.3.  The lowest SpO2 recorded is 85% and 2 minutes of the study was spent with saturations below 90%.  The snore index was 12.4%.   Has not tolerated CPAP or other mouth pieces. Maybe interested in inspire but needs to lose weight.  To start on zepbound      Return in about 3 months (around 4/29/2025).    Diagnoses and all orders for this visit:    Severe obesity (BMI >= 40) ([facility])  -     tirzepatide (Zepbound) 2.5 mg/0.5 mL auto-injector; Inject 0.5 mL (2.5 mg total) under the skin once a week for 28 days    JANET (obstructive sleep apnea)  -     tirzepatide (Zepbound) 2.5 mg/0.5 mL auto-injector; Inject 0.5 mL (2.5 mg total) under the skin once a week for 28 days    BMI 40.0-44.9, adult ([facility])          Subjective:   Chief Complaint   Patient presents with    Consult     MWM Consult; Waist 50in; Patient has sleep apnea        Patient ID: Jamie Mijares  is a 35 y.o. male with excess weight/obesity here to pursue weight management.    Past Medical History:   Diagnosis Date    Clotting disorder ([facility])     Sleep apnea        HPI: Here for MWM consult  History of lap band in 2010 and then it ended up malfunctioning causing vomiting and abdomen pain. Weight was about 220-230  until developing problems with it.  He did have removal in December. Denies any abdomen pain or vomiting. He is interested in starting on zepbound. He does have JANET and wanted to try to get inspire.  He did not tolerate CPAP.      He was doing low carb diet in the past and did well. Notes grazing may be a problem    Obesity/Excess Weight:  Severity:  bmi 41  Onset:  years    Modifiers: Diet and Exercise and Physician Supervised Weight Loss Program    Hydration:water, coffee 48 oz w/splenda/stevia  Alcohol: about 2 drinks a  month  Exercise:movements with kids.  Signing up for the [facility]  Occupation:computer work  Sleep:  Dining out/takeout:2 x week    Diet Recall:  B:2 eggs   L: soup  D: protein , starch, vegetable      The following portions of the patient's history were reviewed and updated as appropriate: He  has a past medical history of Clotting disorder (HCC) and Sleep apnea.  He   Patient Active Problem List    Diagnosis Date Noted    Severe obesity (BMI >= 40) (HCC) 09/16/2024    Postsurgical malabsorption 06/17/2024    JANET (obstructive sleep apnea)     Excessive daytime sleepiness     Non-restorative sleep     History of obstructive sleep apnea 11/25/2022    History of bariatric surgery 11/25/2022    Encounter for surgical aftercare following surgery of digestive system 11/25/2022     He  has a past surgical history that includes Laparoscopic gastric banding; Wrist surgery; Hernia repair; Nasal fracture surgery (2011); and REMOVAL GASTRIC BAND LAPAROSCOPIC (N/A, 12/31/2024).  His family history includes TAYLER disease in his mother; Heart attack in his father; Hepatitis in his maternal uncle; Hyperlipidemia in his father; Hypertension in his father; Lung cancer in his maternal uncle.  He  reports that he quit smoking about 12 years ago. His smoking use included cigarettes. He started smoking about 13 years ago. He has a 0.3 pack-year smoking history. He has never used smokeless tobacco. He reports current alcohol use. He reports that he does not use drugs.  Current Outpatient Medications   Medication Sig Dispense Refill    tirzepatide (Zepbound) 2.5 mg/0.5 mL auto-injector Inject 0.5 mL (2.5 mg total) under the skin once a week for 28 days 2 mL 0    omeprazole (PriLOSEC) 20 mg delayed release capsule Take 1 capsule (20 mg total) by mouth daily (Patient not taking: Reported on 1/29/2025) 90 capsule 0    oxyCODONE (ROXICODONE) 5 immediate release tablet Take 1 tablet (5 mg total) by mouth every 4 (four) hours as needed for moderate  "pain for up to 10 doses Max Daily Amount: 30 mg (Patient not taking: Reported on 1/29/2025) 10 tablet 0     No current facility-administered medications for this visit.     Current Outpatient Medications on File Prior to Visit   Medication Sig    omeprazole (PriLOSEC) 20 mg delayed release capsule Take 1 capsule (20 mg total) by mouth daily (Patient not taking: Reported on 1/29/2025)    oxyCODONE (ROXICODONE) 5 immediate release tablet Take 1 tablet (5 mg total) by mouth every 4 (four) hours as needed for moderate pain for up to 10 doses Max Daily Amount: 30 mg (Patient not taking: Reported on 1/29/2025)    [DISCONTINUED] atorvastatin (LIPITOR) 40 mg tablet Take 1 tablet (40 mg total) by mouth daily (Patient not taking: Reported on 8/29/2024)    [DISCONTINUED] neomycin-polymyxin-hydrocortisone (CORTISPORIN) 0.35%-10,000 units/mL-1% otic suspension Administer 4 drops into both ears 4 (four) times a day (Patient not taking: Reported on 8/19/2024)     No current facility-administered medications on file prior to visit.     He is allergic to aspirin..    Review of Systems   Constitutional:  Negative for fatigue.   Respiratory:  Negative for shortness of breath.    Cardiovascular:  Negative for chest pain and palpitations.   Gastrointestinal:  Negative for abdominal pain, constipation and diarrhea.   Endocrine: Negative for cold intolerance and heat intolerance.   Genitourinary:  Negative for difficulty urinating.   Musculoskeletal:  Negative for arthralgias and back pain.   Skin:  Negative for rash.   Neurological:  Negative for headaches.   Psychiatric/Behavioral:  Negative for dysphoric mood. The patient is not nervous/anxious.        Objective:    /86 (BP Location: Left arm, Patient Position: Sitting)   Pulse 101   Temp (!) 97.2 °F (36.2 °C) (Tympanic)   Resp 16   Ht 5' 10\" (1.778 m)   Wt 132 kg (291 lb 12.8 oz)   BMI 41.87 kg/m²     Physical Exam  Vitals and nursing note reviewed.   Constitutional:       " General: He is not in acute distress.     Appearance: He is well-developed. He is obese.   HENT:      Head: Normocephalic and atraumatic.   Eyes:      Conjunctiva/sclera: Conjunctivae normal.   Neck:      Thyroid: No thyromegaly.   Pulmonary:      Effort: Pulmonary effort is normal. No respiratory distress.   Skin:     Findings: No rash (visible).   Neurological:      Mental Status: He is alert and oriented to person, place, and time.   Psychiatric:         Mood and Affect: Mood normal.         Behavior: Behavior normal.

## 2025-01-29 NOTE — TELEPHONE ENCOUNTER
Pharmacy calling in to request prior auth to be started for zepbound 2.5mg. Pharmacy faxed request.

## 2025-01-31 ENCOUNTER — TELEPHONE (OUTPATIENT)
Dept: BARIATRICS | Facility: CLINIC | Age: 35
End: 2025-01-31

## 2025-01-31 NOTE — TELEPHONE ENCOUNTER
PA for Zepbound 2.5mg SUBMITTED to [email or web address]    via    []CMM-KEY:   []Surescripts-Case ID #   []Availity-Auth ID # NDC #   [x]Faxed to plan   []Other website   []Phone call Case ID #     []PA sent as URGENT    All office notes, labs and other pertaining documents and studies sent. Clinical questions answered. Awaiting determination from insurance company.     Turnaround time for your insurance to make a decision on your Prior Authorization can take 7-21 business days.

## 2025-01-31 NOTE — TELEPHONE ENCOUNTER
PA for Zepbound EXCLUDED from plan       Reason:(Screenshot if applicable)    Requested product is not a covered benefit under patients plan    Message sent to office clinical pool Yes

## 2025-02-07 DIAGNOSIS — G47.33 OSA (OBSTRUCTIVE SLEEP APNEA): ICD-10-CM

## 2025-02-07 DIAGNOSIS — E66.01 SEVERE OBESITY (BMI >= 40) (HCC): ICD-10-CM

## 2025-02-07 RX ORDER — NALTREXONE HYDROCHLORIDE 50 MG/1
TABLET, FILM COATED ORAL
Qty: 30 TABLET | Refills: 2 | Status: SHIPPED | OUTPATIENT
Start: 2025-02-07

## 2025-02-07 RX ORDER — BUPROPION HYDROCHLORIDE 150 MG/1
150 TABLET, EXTENDED RELEASE ORAL 2 TIMES DAILY
Qty: 60 TABLET | Refills: 2 | Status: SHIPPED | OUTPATIENT
Start: 2025-02-07

## 2025-03-24 ENCOUNTER — TELEPHONE (OUTPATIENT)
Dept: CARDIOLOGY CLINIC | Facility: CLINIC | Age: 35
End: 2025-03-24

## 2025-03-26 ENCOUNTER — OFFICE VISIT (OUTPATIENT)
Dept: FAMILY MEDICINE CLINIC | Facility: CLINIC | Age: 35
End: 2025-03-26
Payer: COMMERCIAL

## 2025-03-26 VITALS
WEIGHT: 313 LBS | SYSTOLIC BLOOD PRESSURE: 140 MMHG | DIASTOLIC BLOOD PRESSURE: 80 MMHG | OXYGEN SATURATION: 97 % | BODY MASS INDEX: 44.81 KG/M2 | HEART RATE: 89 BPM | HEIGHT: 70 IN

## 2025-03-26 DIAGNOSIS — G47.33 MODERATE OBSTRUCTIVE SLEEP APNEA: ICD-10-CM

## 2025-03-26 DIAGNOSIS — N52.03 COMBINED ARTERIAL INSUFFICIENCY AND CORPORO-VENOUS OCCLUSIVE ERECTILE DYSFUNCTION: Primary | ICD-10-CM

## 2025-03-26 DIAGNOSIS — Z98.84 H/O LAPAROSCOPIC ADJUSTABLE GASTRIC BANDING: ICD-10-CM

## 2025-03-26 DIAGNOSIS — E66.01 SEVERE OBESITY (BMI >= 40) (HCC): ICD-10-CM

## 2025-03-26 DIAGNOSIS — Z98.84 HISTORY OF BARIATRIC SURGERY: ICD-10-CM

## 2025-03-26 DIAGNOSIS — I10 PRIMARY HYPERTENSION: ICD-10-CM

## 2025-03-26 PROCEDURE — 99214 OFFICE O/P EST MOD 30 MIN: CPT | Performed by: FAMILY MEDICINE

## 2025-03-26 RX ORDER — TADALAFIL 10 MG/1
10 TABLET ORAL DAILY PRN
Qty: 30 TABLET | Refills: 0 | Status: SHIPPED | OUTPATIENT
Start: 2025-03-26

## 2025-03-26 NOTE — ASSESSMENT & PLAN NOTE
Previously having Lap-Band, now reversed.  After reversal there were several complications.  He also notes that he has been eating more due to stress, tiredness, fatigue, and just being up more during the night.  His BMI has increased since the last time I saw him.  He is working with bariatric surgery/weight management to help with his weight.  Unfortunately he was unable to obtain the GLP-1 injectables which he felt would likely be helpful.

## 2025-03-26 NOTE — PROGRESS NOTES
Name: Jamie Mijares      : 1990      MRN: [id number]  Encounter Provider: Enrico Schmitt DO  Encounter Date: 3/26/2025   Encounter department: [facility]  :  Assessment & Plan  Combined arterial insufficiency and corporo-venous occlusive erectile dysfunction  Patient has been having difficulty with stress associated his child staying up long hours, low energy by the end of the day, and low libido.  He is interested in having a alternative medication that might help him obtain and maintain erection for a longer period of time so that he can continue to be intimate with his wife.  It has been putting a small amount of strain on their relationship, and he would be interested in attempting the as needed Cialis  Orders:    tadalafil (CIALIS) 10 MG tablet; Take 1 tablet (10 mg total) by mouth daily as needed for erectile dysfunction    Moderate obstructive sleep apnea  Patient has been intermittently compliant with the CPAP.  He does find that it is difficult to keep on all the time especially since he is up with his son most of the night.  This is likely going to greatly reduce his compliance on garret and machine.       H/O laparoscopic adjustable gastric banding  Previously having Lap-Band, now reversed.  After reversal there were several complications.  He also notes that he has been eating more due to stress, tiredness, fatigue, and just being up more during the night.  His BMI has increased since the last time I saw him.  He is working with bariatric surgery/weight management to help with his weight.  Unfortunately he was unable to obtain the GLP-1 injectables which he felt would likely be helpful.  Therefore he will continue to follow-up with weight management and attempt oral medication.  He is currently on Wellbutrin and naltrexone.  The naltrexone may be causing some of his issues with libido, but Wellbutrin may increase this in turn.       History of bariatric  "surgery  See history of laparoscopic adjustable gastric banding       Severe obesity (BMI >= 40) (HCC)  Previously having Lap-Band, now reversed.  After reversal there were several complications.  He also notes that he has been eating more due to stress, tiredness, fatigue, and just being up more during the night.  His BMI has increased since the last time I saw him.  He is working with bariatric surgery/weight management to help with his weight.  Unfortunately he was unable to obtain the GLP-1 injectables which he felt would likely be helpful.         Primary hypertension  Patient has elevated BP on presentation today.  He believes that this is multifactorial with the various issues mentioned above.  He will be attempting to increase his activity outside.  He is not interested in medication at this time.  He knows that he can get this down naturally.              History of Present Illness   Patient presents today for follow-up of chronic conditions.  He has had a difficult journey over the last several months.  He had the Lap-Band removed, since then he has been noticing an increase in his weight due to a lack of restriction of calories with the Lap-Band.  Additionally the patient had complications from the surgery as well and needed to return in December.  He has been following with weight management and unfortunately was unable to get the injectable medication, he was started on Contrave or a combination of Wellbutrin and naltrexone.  He does note that the naltrexone is likely causing his libido to decrease along with the frequency of his son waking him up overnight.  He is only getting about 2 to 3 hours of sleep.      Review of Systems    Objective   /80   Pulse 89   Ht 5' 10\" (1.778 m)   Wt (!) 142 kg (313 lb)   SpO2 97%   BMI 44.91 kg/m²      Physical Exam  Constitutional:       General: He is not in acute distress.     Appearance: Normal appearance. He is obese. He is not ill-appearing, " toxic-appearing or diaphoretic.   HENT:      Head: Normocephalic and atraumatic.   Cardiovascular:      Rate and Rhythm: Normal rate and regular rhythm.      Heart sounds: Normal heart sounds. No murmur heard.     No friction rub. No gallop.   Pulmonary:      Effort: Pulmonary effort is normal. No respiratory distress.      Breath sounds: Normal breath sounds. No stridor. No wheezing, rhonchi or rales.   Abdominal:      General: Bowel sounds are normal. There is no distension.      Palpations: Abdomen is soft.      Tenderness: There is no abdominal tenderness.   Skin:     General: Skin is warm.      Capillary Refill: Capillary refill takes less than 2 seconds.   Neurological:      Mental Status: He is alert.

## 2025-03-28 DIAGNOSIS — G47.33 OSA (OBSTRUCTIVE SLEEP APNEA): ICD-10-CM

## 2025-03-28 DIAGNOSIS — E66.01 SEVERE OBESITY (BMI >= 40) (HCC): ICD-10-CM

## 2025-03-28 RX ORDER — BUPROPION HYDROCHLORIDE 150 MG/1
150 TABLET, EXTENDED RELEASE ORAL 2 TIMES DAILY
Qty: 180 TABLET | Refills: 1 | Status: SHIPPED | OUTPATIENT
Start: 2025-03-28

## 2025-04-29 ENCOUNTER — TELEPHONE (OUTPATIENT)
Age: 35
End: 2025-04-29

## 2025-04-29 NOTE — TELEPHONE ENCOUNTER
Patient cancelled appointment today with Dr. Elizabeth.  Rescheduled for next available at end of July.  If any cancellations become available after 3:00, please call patient. Also added to wait list.

## 2025-07-03 ENCOUNTER — RA CDI HCC (OUTPATIENT)
Dept: OTHER | Facility: HOSPITAL | Age: 35
End: 2025-07-03

## 2025-07-03 PROBLEM — Z48.815 ENCOUNTER FOR SURGICAL AFTERCARE FOLLOWING SURGERY OF DIGESTIVE SYSTEM: Status: RESOLVED | Noted: 2022-11-25 | Resolved: 2025-07-03

## 2025-08-06 ENCOUNTER — TELEPHONE (OUTPATIENT)
Dept: BARIATRICS | Facility: CLINIC | Age: 35
End: 2025-08-06

## 2025-08-06 ENCOUNTER — OFFICE VISIT (OUTPATIENT)
Dept: FAMILY MEDICINE CLINIC | Facility: CLINIC | Age: 35
End: 2025-08-06
Payer: COMMERCIAL

## 2025-08-06 VITALS
DIASTOLIC BLOOD PRESSURE: 84 MMHG | SYSTOLIC BLOOD PRESSURE: 140 MMHG | WEIGHT: 315 LBS | HEART RATE: 79 BPM | OXYGEN SATURATION: 98 % | HEIGHT: 70 IN | BODY MASS INDEX: 45.1 KG/M2

## 2025-08-06 DIAGNOSIS — B02.9 HERPES ZOSTER WITHOUT COMPLICATION: Primary | ICD-10-CM

## 2025-08-06 PROCEDURE — 99213 OFFICE O/P EST LOW 20 MIN: CPT | Performed by: FAMILY MEDICINE

## 2025-08-06 RX ORDER — VALACYCLOVIR HYDROCHLORIDE 1 G/1
1000 TABLET, FILM COATED ORAL 3 TIMES DAILY
Qty: 21 TABLET | Refills: 0 | Status: SHIPPED | OUTPATIENT
Start: 2025-08-06 | End: 2025-08-13

## 2025-08-07 DIAGNOSIS — G47.33 OSA (OBSTRUCTIVE SLEEP APNEA): ICD-10-CM

## 2025-08-07 DIAGNOSIS — E66.01 SEVERE OBESITY (BMI >= 40) (HCC): ICD-10-CM

## 2025-08-07 RX ORDER — BUPROPION HYDROCHLORIDE 200 MG/1
200 TABLET, EXTENDED RELEASE ORAL 2 TIMES DAILY
Qty: 90 TABLET | Refills: 0 | Status: SHIPPED | OUTPATIENT
Start: 2025-08-07

## 2025-08-07 RX ORDER — NALTREXONE HYDROCHLORIDE 50 MG/1
TABLET, FILM COATED ORAL
Qty: 90 TABLET | Refills: 0 | Status: SHIPPED | OUTPATIENT
Start: 2025-08-07

## (undated) DEVICE — POWER SHELL: Brand: SIGNIA

## (undated) DEVICE — BLUE HEAT SCOPE WARMER

## (undated) DEVICE — EXOFIN PRECISION PEN HIGH VISCOSITY TOPICAL SKIN ADHESIVE: Brand: EXOFIN PRECISION PEN, 1G

## (undated) DEVICE — VIOLET BRAIDED (POLYGLACTIN 910), SYNTHETIC ABSORBABLE SUTURE: Brand: COATED VICRYL

## (undated) DEVICE — TROCAR: Brand: KII FIOS FIRST ENTRY

## (undated) DEVICE — CANNULA, STANDARD, REUSABLE

## (undated) DEVICE — MEDI-VAC YANKAUER SUCTION HANDLE W/BULBOUS AND CONTROL VENT: Brand: CARDINAL HEALTH

## (undated) DEVICE — CHLORAPREP HI-LITE 26ML ORANGE

## (undated) DEVICE — TIBURON LAPAROSCOPIC ABDOMINAL DRAPE: Brand: CONVERTORS

## (undated) DEVICE — 4-PORT MANIFOLD: Brand: NEPTUNE 2

## (undated) DEVICE — DRAPE TOWEL: Brand: CONVERTORS

## (undated) DEVICE — NEEDLE SPINAL18G X 3.5 IN QUINCKE

## (undated) DEVICE — TUBING SMOKE EVAC W/FILTRATION DEVICE PLUMEPORT ACTIV

## (undated) DEVICE — CANNULA SEAL

## (undated) DEVICE — INTENDED FOR TISSUE SEPARATION, AND OTHER PROCEDURES THAT REQUIRE A SHARP SURGICAL BLADE TO PUNCTURE OR CUT.: Brand: BARD-PARKER SAFETY BLADES SIZE 15, STERILE

## (undated) DEVICE — COLUMN DRAPE

## (undated) DEVICE — CADIERE FORCEPS: Brand: ENDOWRIST

## (undated) DEVICE — TIP-UP FENESTRATED GRASPER: Brand: ENDOWRIST

## (undated) DEVICE — ANTI-FOG SOLUTION WITH FOAM PAD: Brand: DEVON

## (undated) DEVICE — SUT VICRYL 0 UR-6 27 IN J603H

## (undated) DEVICE — ARM DRAPE

## (undated) DEVICE — DRAPE EQUIPMENT RF WAND

## (undated) DEVICE — ELECTRO LUBE IS A SINGLE PATIENT USE DEVICE THAT IS INTENDED TO BE USED ON ELECTROSURGICAL ELECTRODES TO REDUCE STICKING.: Brand: KEY SURGICAL ELECTRO LUBE

## (undated) DEVICE — TUBE SET SMOKE EVAC PNEUMOCLEAR HIGH FLOW

## (undated) DEVICE — LAPAROSCOPIC TROCAR SLEEVE/SINGLE USE: Brand: KII® SLEEVE

## (undated) DEVICE — SYRINGE 20ML LL

## (undated) DEVICE — GLOVE SRG BIOGEL 8

## (undated) DEVICE — SUT VICRYL PLUS 0 UR-6 27IN VCP603H

## (undated) DEVICE — ROBOT INST CANNULA 8MM OBTURATOR BLUNT DISP

## (undated) DEVICE — SYRINGE 30ML LL

## (undated) DEVICE — DECANTER: Brand: UNBRANDED

## (undated) DEVICE — WEBRIL 6 IN UNSTERILE

## (undated) DEVICE — GLOVE SRG BIOGEL 7.5

## (undated) DEVICE — SUT VICRYL 2-0 SH 27 IN UNDYED J417H

## (undated) DEVICE — PMI DISPOSABLE PUNCTURE CLOSURE DEVICE / SUTURE GRASPER: Brand: PMI

## (undated) DEVICE — ANTIBACTERIAL UNDYED BRAIDED (POLYGLACTIN 910), SYNTHETIC ABSORBABLE SUTURE: Brand: COATED VICRYL

## (undated) DEVICE — VESSEL SEALER EXTEND: Brand: ENDOWRIST

## (undated) DEVICE — SUT MONOCRYL 4-0 PS-2 27 IN Y426H

## (undated) DEVICE — SURGICAL GOWN, XL SMARTSLEEVE: Brand: CONVERTORS

## (undated) DEVICE — TUBING SUCTION 5MM X 12 FT

## (undated) DEVICE — [HIGH FLOW INSUFFLATOR,  DO NOT USE IF PACKAGE IS DAMAGED,  KEEP DRY,  KEEP AWAY FROM SUNLIGHT,  PROTECT FROM HEAT AND RADIOACTIVE SOURCES.]: Brand: PNEUMOSURE

## (undated) DEVICE — ENDOPATH PNEUMONEEDLE INSUFFLATION NEEDLES WITH LUER LOCK CONNECTORS 120MM: Brand: ENDOPATH

## (undated) DEVICE — BETHLEHEM UNIVERSAL MINOR GEN: Brand: CARDINAL HEALTH

## (undated) DEVICE — TRAVELKIT CONTAINS FIRST STEP KIT (200ML EP-4 KIT) AND SOILED SCOPE BAG - 1 KIT: Brand: TRAVELKIT CONTAINS FIRST STEP KIT AND SOILED SCOPE BAG